# Patient Record
Sex: FEMALE | Race: WHITE | Employment: FULL TIME | URBAN - METROPOLITAN AREA
[De-identification: names, ages, dates, MRNs, and addresses within clinical notes are randomized per-mention and may not be internally consistent; named-entity substitution may affect disease eponyms.]

---

## 2019-06-25 ENCOUNTER — OFFICE VISIT (OUTPATIENT)
Dept: FAMILY MEDICINE CLINIC | Age: 29
End: 2019-06-25
Payer: COMMERCIAL

## 2019-06-25 ENCOUNTER — TELEPHONE (OUTPATIENT)
Dept: FAMILY MEDICINE CLINIC | Age: 29
End: 2019-06-25

## 2019-06-25 VITALS
DIASTOLIC BLOOD PRESSURE: 86 MMHG | BODY MASS INDEX: 33.74 KG/M2 | TEMPERATURE: 99.2 F | HEART RATE: 76 BPM | HEIGHT: 67 IN | SYSTOLIC BLOOD PRESSURE: 116 MMHG | WEIGHT: 215 LBS

## 2019-06-25 DIAGNOSIS — J30.9 ALLERGIC RHINITIS, UNSPECIFIED SEASONALITY, UNSPECIFIED TRIGGER: Primary | ICD-10-CM

## 2019-06-25 PROCEDURE — 99213 OFFICE O/P EST LOW 20 MIN: CPT | Performed by: PHYSICIAN ASSISTANT

## 2019-06-25 RX ORDER — RANITIDINE 150 MG/1
150 TABLET ORAL 2 TIMES DAILY
COMMUNITY
End: 2019-06-25

## 2019-06-25 RX ORDER — DULOXETIN HYDROCHLORIDE 60 MG/1
60 CAPSULE, DELAYED RELEASE ORAL DAILY
COMMUNITY
End: 2019-09-11

## 2019-06-25 NOTE — PROGRESS NOTES
6/25/2019    Samira Santiago    Chief Complaint   Patient presents with    Other     SICK VISIT FOR SORE THROAT AND HEADACHE. SX'S X 2 WEEKS. REPORTS THAT SHE WENT TO URGENT CARE A WEEK AGO AND HAD A NEGATIVE STREP TEST.  WAS STARTED ON MEDROL DOSE PACK AND FINISHED IT.  STILL C/O SORE THROAT AND HEADACHE. REPORTS THAT SHE IS LEAVING FOR VACATION ON THURSDAY. HPI  History was obtained from the patient. Nathan Sanches is a 34 y.o. female who presents today for sick office visit. Sore Throat: Patient complains of sore throat. Associated symptoms include bilateral ear fullness, sinus headache, sinus and nasal congestion, PND and sore throat. Sore throat is worse first thing in the morning, no pain with eating or drinking, no changes in her voice. Onset of symptoms was 2 weeks ago, stable since that time. She is drinking plenty of fluids. She has not had recent close exposure to someone with proven streptococcal pharyngitis. Was seen at [address] [place] [address] on [facility]; did steroids and had a negative strep swab. Son had croup. Taking Ibuprofen  mg TID, which helps with pain. REVIEW OF SYMPTOMS    Review of Systems   Constitutional: Negative for appetite change, chills and fever. HENT: Positive for sinus pressure, postnasal drainage and sore throat. Negative for congestion, rhinorrhea, sneezing, trouble swallowing and voice change. Eyes: Positive for discharge (watery). Negative for itching. Respiratory: Negative for cough, shortness of breath and wheezing. Cardiovascular: Negative for chest pain. Musculoskeletal: Negative for myalgias.        SOCIAL HISTORY  Social History     Socioeconomic History    Marital status:      Spouse name: None    Number of children: None    Years of education: None    Highest education level: None   Occupational History    None   Social Needs    Financial resource strain: None    Food insecurity:     Worry: None     Inability: None    Transportation needs: Medical: None     Non-medical: None   Tobacco Use    Smoking status: Former Smoker     Packs/day: 0.25     Years: 9.00     Pack years: 2.25     Last attempt to quit: 2019     Years since quittin.1    Smokeless tobacco: Never Used   Substance and Sexual Activity    Alcohol use: No    Drug use: No    Sexual activity: Yes     Partners: Male   Lifestyle    Physical activity:     Days per week: None     Minutes per session: None    Stress: None   Relationships    Social connections:     Talks on phone: None     Gets together: None     Attends Taoist service: None     Active member of club or organization: None     Attends meetings of clubs or organizations: None     Relationship status: None    Intimate partner violence:     Fear of current or ex partner: None     Emotionally abused: None     Physically abused: None     Forced sexual activity: None   Other Topics Concern    None   Social History Narrative    None        CURRENT MEDICATIONS  Current Outpatient Medications   Medication Sig Dispense Refill    DULoxetine (CYMBALTA) 60 MG extended release capsule Take 60 mg by mouth daily Rx per Dr Arielle Phillips      lamoTRIgine (LAMICTAL) 100 MG tablet Take 100 mg by mouth daily      levothyroxine (SYNTHROID) 50 MCG tablet Take 50 mcg by mouth Daily      ibuprofen (ADVIL;MOTRIN) 600 MG tablet Take 1 tablet by mouth every 6 hours as needed for Pain 30 tablet 1     No current facility-administered medications for this visit. PHYSICAL EXAM    /86 (Site: Left Upper Arm)   Pulse 76   Temp 100.5 °F (38.1 °C)   Ht 5' 7\" (1.702 m)   Wt 215 lb (97.5 kg)   BMI 33.67 kg/m²  Oral Temp: 99.2    Physical Exam   Constitutional: She is oriented to person, place, and time. She appears well-developed and well-nourished. HENT:   Head: Normocephalic and atraumatic.    Right Ear: Tympanic membrane, external ear and ear canal normal.   Left Ear: Tympanic membrane, external ear and ear canal normal.   Nose: Nose normal. No mucosal edema. Right sinus exhibits no maxillary sinus tenderness and no frontal sinus tenderness. Left sinus exhibits no maxillary sinus tenderness and no frontal sinus tenderness. Mouth/Throat: Uvula is midline and mucous membranes are normal. No oral lesions. No uvula swelling. No oropharyngeal exudate or posterior oropharyngeal erythema. Cardiovascular: Normal rate and regular rhythm. Pulmonary/Chest: Effort normal and breath sounds normal.   Neurological: She is alert and oriented to person, place, and time. Psychiatric: She has a normal mood and affect. Her behavior is normal.   Nursing note and vitals reviewed. ASSESSMENT & PLAN    1. Allergic rhinitis  There are no signs of infection, exam is completely benign, sinuses illuminate well, pharynx is unremarkable. Trial of Flonase 2 sprays each nostril once daily, #1+0 refills and cetirizine 10 mg 1 by mouth daily #30+0 refills. If still no improvement could do trial of antibiotics but again there were no signs of any bacterial infection present. Electronically signed by Mayra Rajput PA-C on 6/25/2019    Please note that this chart was generated using dragon dictation software. Although every effort was made to ensure the accuracy of this automated transcription, some errors in transcription may have occurred.

## 2019-06-25 NOTE — TELEPHONE ENCOUNTER
----- Message from Remigio Peters sent at 6/24/2019  2:26 PM EDT -----  Contact: PATIENT  WENT TO  A WEEK AGO/ RAPID STREP CAME BACK NEGATIVE. THROAT STILL HURTING. THEY GAVE HER STEROIDS.  WHAT TO DO?   [phone]

## 2019-09-11 ENCOUNTER — OFFICE VISIT (OUTPATIENT)
Dept: FAMILY MEDICINE CLINIC | Age: 29
End: 2019-09-11
Payer: COMMERCIAL

## 2019-09-11 VITALS
WEIGHT: 227 LBS | SYSTOLIC BLOOD PRESSURE: 120 MMHG | DIASTOLIC BLOOD PRESSURE: 78 MMHG | BODY MASS INDEX: 35.63 KG/M2 | HEART RATE: 84 BPM | HEIGHT: 67 IN

## 2019-09-11 DIAGNOSIS — E66.09 CLASS 2 OBESITY DUE TO EXCESS CALORIES WITHOUT SERIOUS COMORBIDITY WITH BODY MASS INDEX (BMI) OF 35.0 TO 35.9 IN ADULT: Primary | ICD-10-CM

## 2019-09-11 DIAGNOSIS — E03.9 ACQUIRED HYPOTHYROIDISM: ICD-10-CM

## 2019-09-11 DIAGNOSIS — R53.83 FATIGUE, UNSPECIFIED TYPE: ICD-10-CM

## 2019-09-11 DIAGNOSIS — R63.5 WEIGHT GAIN, ABNORMAL: ICD-10-CM

## 2019-09-11 LAB
A/G RATIO: 1.8 (ref 1.1–2.2)
ALBUMIN SERPL-MCNC: 4.6 G/DL (ref 3.4–5)
ALP BLD-CCNC: 82 U/L (ref 40–129)
ALT SERPL-CCNC: 21 U/L (ref 10–40)
ANION GAP SERPL CALCULATED.3IONS-SCNC: 14 MMOL/L (ref 3–16)
AST SERPL-CCNC: 17 U/L (ref 15–37)
BASOPHILS ABSOLUTE: 0 K/UL (ref 0–0.2)
BASOPHILS RELATIVE PERCENT: 0.5 %
BILIRUB SERPL-MCNC: 0.3 MG/DL (ref 0–1)
BUN BLDV-MCNC: 11 MG/DL (ref 7–20)
CALCIUM SERPL-MCNC: 9.3 MG/DL (ref 8.3–10.6)
CHLORIDE BLD-SCNC: 101 MMOL/L (ref 99–110)
CO2: 26 MMOL/L (ref 21–32)
CREAT SERPL-MCNC: 0.8 MG/DL (ref 0.6–1.1)
EOSINOPHILS ABSOLUTE: 0.2 K/UL (ref 0–0.6)
EOSINOPHILS RELATIVE PERCENT: 2.2 %
GFR AFRICAN AMERICAN: >60
GFR NON-AFRICAN AMERICAN: >60
GLOBULIN: 2.5 G/DL
GLUCOSE BLD-MCNC: 84 MG/DL (ref 70–99)
HCG QUALITATIVE: NEGATIVE
HCT VFR BLD CALC: 39.3 % (ref 36–48)
HEMOGLOBIN: 13.5 G/DL (ref 12–16)
LYMPHOCYTES ABSOLUTE: 2.5 K/UL (ref 1–5.1)
LYMPHOCYTES RELATIVE PERCENT: 30.2 %
MCH RBC QN AUTO: 30.7 PG (ref 26–34)
MCHC RBC AUTO-ENTMCNC: 34.4 G/DL (ref 31–36)
MCV RBC AUTO: 89.5 FL (ref 80–100)
MONOCYTES ABSOLUTE: 0.4 K/UL (ref 0–1.3)
MONOCYTES RELATIVE PERCENT: 5.4 %
NEUTROPHILS ABSOLUTE: 5.1 K/UL (ref 1.7–7.7)
NEUTROPHILS RELATIVE PERCENT: 61.7 %
PDW BLD-RTO: 13.3 % (ref 12.4–15.4)
PLATELET # BLD: 319 K/UL (ref 135–450)
PMV BLD AUTO: 8.3 FL (ref 5–10.5)
POTASSIUM SERPL-SCNC: 4.3 MMOL/L (ref 3.5–5.1)
RBC # BLD: 4.4 M/UL (ref 4–5.2)
SODIUM BLD-SCNC: 141 MMOL/L (ref 136–145)
T4 FREE: 1.3 NG/DL (ref 0.9–1.8)
TOTAL PROTEIN: 7.1 G/DL (ref 6.4–8.2)
WBC # BLD: 8.3 K/UL (ref 4–11)

## 2019-09-11 PROCEDURE — 90688 IIV4 VACCINE SPLT 0.5 ML IM: CPT | Performed by: FAMILY MEDICINE

## 2019-09-11 PROCEDURE — 99214 OFFICE O/P EST MOD 30 MIN: CPT | Performed by: FAMILY MEDICINE

## 2019-09-11 PROCEDURE — 90471 IMMUNIZATION ADMIN: CPT | Performed by: FAMILY MEDICINE

## 2019-09-11 RX ORDER — PHENTERMINE HYDROCHLORIDE 37.5 MG/1
37.5 TABLET ORAL
Qty: 30 TABLET | Refills: 0 | Status: SHIPPED | OUTPATIENT
Start: 2019-09-11 | End: 2019-10-11

## 2019-09-11 SDOH — HEALTH STABILITY: MENTAL HEALTH: HOW MANY STANDARD DRINKS CONTAINING ALCOHOL DO YOU HAVE ON A TYPICAL DAY?: 1 OR 2

## 2019-09-11 ASSESSMENT — ENCOUNTER SYMPTOMS
SHORTNESS OF BREATH: 0
ALLERGIC/IMMUNOLOGIC NEGATIVE: 1
ABDOMINAL PAIN: 0
SINUS PRESSURE: 0
COUGH: 0
SORE THROAT: 0
RHINORRHEA: 0
CHEST TIGHTNESS: 0
DIARRHEA: 0
EYES NEGATIVE: 1
GASTROINTESTINAL NEGATIVE: 1
RESPIRATORY NEGATIVE: 1
CONSTIPATION: 0

## 2019-09-11 ASSESSMENT — PATIENT HEALTH QUESTIONNAIRE - PHQ9
SUM OF ALL RESPONSES TO PHQ9 QUESTIONS 1 & 2: 0
SUM OF ALL RESPONSES TO PHQ QUESTIONS 1-9: 0
2. FEELING DOWN, DEPRESSED OR HOPELESS: 0
1. LITTLE INTEREST OR PLEASURE IN DOING THINGS: 0
SUM OF ALL RESPONSES TO PHQ QUESTIONS 1-9: 0

## 2019-09-11 NOTE — PROGRESS NOTES
Negative. Negative for abdominal pain, constipation and diarrhea. Endocrine: Negative. Genitourinary: Positive for menstrual problem. Negative for dysuria and frequency. Musculoskeletal: Negative. Negative for myalgias. Skin: Negative. Allergic/Immunologic: Negative. Neurological: Negative. Hematological: Negative. Psychiatric/Behavioral: Negative. PAST MEDICAL HISTORY  Past Medical History:   Diagnosis Date    Anxiety        FAMILY HISTORY  Family History   Problem Relation Age of Onset   [de-identified] / [place] Mother     High Blood Pressure Father     High Cholesterol Father        SOCIAL HISTORY  Social History     Socioeconomic History    Marital status:      Spouse name: None    Number of children: None    Years of education: None    Highest education level: None   Occupational History    None   Social Needs    Financial resource strain: None    Food insecurity:     Worry: None     Inability: None    Transportation needs:     Medical: None     Non-medical: None   Tobacco Use    Smoking status: Former Smoker     Packs/day: 0.25     Years: 9.00     Pack years: 2.25     Start date:      Last attempt to quit: 2019     Years since quittin.3    Smokeless tobacco: Never Used   Substance and Sexual Activity    Alcohol use:  Yes     Alcohol/week: 1.0 - 2.0 standard drinks     Types: 1 - 2 Cans of beer per week     Drinks per session: 1 or 2     Binge frequency: Weekly    Drug use: No    Sexual activity: Yes     Partners: Male   Lifestyle    Physical activity:     Days per week: None     Minutes per session: None    Stress: None   Relationships    Social connections:     Talks on phone: None     Gets together: None     Attends Pentecostal service: None     Active member of club or organization: None     Attends meetings of clubs or organizations: None     Relationship status: None    Intimate partner violence:     Fear of current or ex partner: None Emotionally abused: None     Physically abused: None     Forced sexual activity: None   Other Topics Concern    None   Social History Narrative    None        SURGICAL HISTORY  No past surgical history on file. CURRENT MEDICATIONS  Current Outpatient Medications   Medication Sig Dispense Refill    fluticasone (FLONASE) 50 MCG/ACT nasal spray 2 sprays by Each Nostril route daily 1 Bottle 0    levothyroxine (SYNTHROID) 50 MCG tablet Take 50 mcg by mouth Daily      ibuprofen (ADVIL;MOTRIN) 600 MG tablet Take 1 tablet by mouth every 6 hours as needed for Pain 30 tablet 1     No current facility-administered medications for this visit. ALLERGIES  Allergies   Allergen Reactions    Codeine Palpitations       PHYSICAL EXAM    /78   Pulse 84   Ht 5' 7\" (1.702 m)   Wt 227 lb (103 kg)   BMI 35.55 kg/m²     Physical Exam   Constitutional: She is oriented to person, place, and time. She appears well-developed and well-nourished. No distress. HENT:   Head: Normocephalic. Eyes: Pupils are equal, round, and reactive to light. Conjunctivae and EOM are normal.   Neck: Normal range of motion. Neck supple. Cardiovascular: Normal rate, regular rhythm and normal heart sounds. Exam reveals no gallop and no friction rub. No murmur heard. Heart regular, not fast.   Pulmonary/Chest: Effort normal and breath sounds normal. No stridor. No respiratory distress. She has no wheezes. She has no rales. She exhibits no tenderness. Lungs clear   Musculoskeletal: Normal range of motion. She exhibits no edema, tenderness or deformity. Neurological: She is alert and oriented to person, place, and time. Skin: Skin is warm and dry. No rash noted. She is not diaphoretic. No erythema. No pallor. Psychiatric: She has a normal mood and affect. Thought content normal.   Nursing note and vitals reviewed. ASSESSMENT & PLAN    1.  Class 2 obesity due to excess calories without serious comorbidity with body mass

## 2019-09-13 RX ORDER — LEVOTHYROXINE SODIUM 0.05 MG/1
50 TABLET ORAL DAILY
Qty: 90 TABLET | Refills: 1 | Status: SHIPPED | OUTPATIENT
Start: 2019-09-13 | End: 2020-03-09 | Stop reason: SDUPTHER

## 2019-09-13 RX ORDER — LEVOTHYROXINE SODIUM 0.05 MG/1
TABLET ORAL
Qty: 30 TABLET | Refills: 0 | OUTPATIENT
Start: 2019-09-13

## 2019-10-07 ENCOUNTER — PATIENT MESSAGE (OUTPATIENT)
Dept: FAMILY MEDICINE CLINIC | Age: 29
End: 2019-10-07

## 2019-12-05 ENCOUNTER — TELEPHONE (OUTPATIENT)
Dept: FAMILY MEDICINE CLINIC | Age: 29
End: 2019-12-05

## 2019-12-09 ENCOUNTER — OFFICE VISIT (OUTPATIENT)
Dept: FAMILY MEDICINE CLINIC | Age: 29
End: 2019-12-09
Payer: COMMERCIAL

## 2019-12-09 VITALS
HEART RATE: 93 BPM | OXYGEN SATURATION: 99 % | BODY MASS INDEX: 36.57 KG/M2 | SYSTOLIC BLOOD PRESSURE: 116 MMHG | DIASTOLIC BLOOD PRESSURE: 78 MMHG | HEIGHT: 67 IN | WEIGHT: 233 LBS

## 2019-12-09 DIAGNOSIS — F41.9 ANXIETY: Primary | ICD-10-CM

## 2019-12-09 PROCEDURE — 99213 OFFICE O/P EST LOW 20 MIN: CPT | Performed by: NURSE PRACTITIONER

## 2019-12-09 RX ORDER — HYDROXYZINE HYDROCHLORIDE 25 MG/1
25 TABLET, FILM COATED ORAL 3 TIMES DAILY PRN
Qty: 90 TABLET | Refills: 0 | Status: SHIPPED | OUTPATIENT
Start: 2019-12-09 | End: 2020-03-09

## 2019-12-09 RX ORDER — CITALOPRAM 20 MG/1
20 TABLET ORAL DAILY
Qty: 30 TABLET | Refills: 1 | Status: SHIPPED | OUTPATIENT
Start: 2019-12-09 | End: 2020-03-09

## 2019-12-09 ASSESSMENT — ENCOUNTER SYMPTOMS
RESPIRATORY NEGATIVE: 1
VOMITING: 0
NAUSEA: 0
DIARRHEA: 0

## 2020-02-13 ENCOUNTER — TELEPHONE (OUTPATIENT)
Dept: FAMILY MEDICINE CLINIC | Age: 30
End: 2020-02-13

## 2020-02-13 NOTE — TELEPHONE ENCOUNTER
TO DR. Quintin Sullivan-    HER THERAPIST (POST KARTHIKEYAN PRESPECTIVE), DR. Spangler Askew, FAXED YOU LAST WEEK REGARDING HER ADHD----WHAT SHOULD HER NEXT STEP BE? (WANTS TO MAKE SURE YOU RECEIVED THIS REPORT)---SHOULD SHE MAKE AN APPT WITH YOU?     PHONE:  [phone]

## 2020-02-14 RX ORDER — DEXTROAMPHETAMINE SACCHARATE, AMPHETAMINE ASPARTATE MONOHYDRATE, DEXTROAMPHETAMINE SULFATE AND AMPHETAMINE SULFATE 3.75; 3.75; 3.75; 3.75 MG/1; MG/1; MG/1; MG/1
15 CAPSULE, EXTENDED RELEASE ORAL DAILY
Qty: 30 CAPSULE | Refills: 0 | Status: SHIPPED
Start: 2020-02-14 | End: 2020-03-09 | Stop reason: SINTOL

## 2020-02-21 ENCOUNTER — TELEPHONE (OUTPATIENT)
Dept: FAMILY MEDICINE CLINIC | Age: 30
End: 2020-02-21

## 2020-02-21 NOTE — TELEPHONE ENCOUNTER
Pt had pharmacy send over prior auth for adderall but for the generic version-- not the name brand medication-- trinh paper urgent it will be able to be done today

## 2020-03-09 ENCOUNTER — OFFICE VISIT (OUTPATIENT)
Dept: FAMILY MEDICINE CLINIC | Age: 30
End: 2020-03-09
Payer: COMMERCIAL

## 2020-03-09 VITALS
HEIGHT: 67 IN | DIASTOLIC BLOOD PRESSURE: 72 MMHG | SYSTOLIC BLOOD PRESSURE: 110 MMHG | BODY MASS INDEX: 35.94 KG/M2 | WEIGHT: 229 LBS | HEART RATE: 80 BPM

## 2020-03-09 DIAGNOSIS — E03.9 ACQUIRED HYPOTHYROIDISM: ICD-10-CM

## 2020-03-09 PROBLEM — F98.8 ATTENTION DEFICIT DISORDER (ADD) WITHOUT HYPERACTIVITY: Status: ACTIVE | Noted: 2020-03-09

## 2020-03-09 LAB
T3 FREE: 3.6 PG/ML (ref 2.3–4.2)
T4 FREE: 1.2 NG/DL (ref 0.9–1.8)
TSH SERPL DL<=0.05 MIU/L-ACNC: 2.04 UIU/ML (ref 0.27–4.2)

## 2020-03-09 PROCEDURE — 99213 OFFICE O/P EST LOW 20 MIN: CPT | Performed by: FAMILY MEDICINE

## 2020-03-09 RX ORDER — METHYLPHENIDATE HYDROCHLORIDE 18 MG/1
18 TABLET ORAL EVERY MORNING
COMMUNITY
End: 2020-03-16

## 2020-03-09 RX ORDER — LEVOTHYROXINE SODIUM 0.05 MG/1
50 TABLET ORAL DAILY
Qty: 90 TABLET | Refills: 1 | Status: SHIPPED | OUTPATIENT
Start: 2020-03-09 | End: 2020-09-14

## 2020-03-09 ASSESSMENT — PATIENT HEALTH QUESTIONNAIRE - PHQ9
SUM OF ALL RESPONSES TO PHQ QUESTIONS 1-9: 0
2. FEELING DOWN, DEPRESSED OR HOPELESS: 0
SUM OF ALL RESPONSES TO PHQ QUESTIONS 1-9: 0
SUM OF ALL RESPONSES TO PHQ9 QUESTIONS 1 & 2: 0
1. LITTLE INTEREST OR PLEASURE IN DOING THINGS: 0

## 2020-03-09 ASSESSMENT — ENCOUNTER SYMPTOMS
CHEST TIGHTNESS: 0
SHORTNESS OF BREATH: 0
RHINORRHEA: 0
SORE THROAT: 0
SINUS PRESSURE: 0

## 2020-03-10 NOTE — PROGRESS NOTES
3/9/2020    Silvano Nuñez    Chief Complaint   Patient presents with    6 Month Follow-Up    Other     no c/o       HPI  Elgie Spatz is a 34 y.o. female who presents today for follow up:    Patient states that she is unclear whether the medication for ADD is going to be good for her or not. It was recommended by a psychologist.  We were trying to get the stimulant medicine ordered for her but her insurance would not approve it. She did get her psychiatrist to write it and insurance approved it. She is just starting off with it. She has had some symptoms of heart racing with it so is unclear whether it will be helpful to her. We discussed tachycardia and healthy  resting pulse rates. Patient denies side effects of the thyroid medication. She is willing to get her thyroid checked today. She notes compliance with the medication. REVIEW OF SYMPTOMS  Review of Systems   Constitutional: Negative for chills and fever. HENT: Negative for ear pain, rhinorrhea, sinus pressure and sore throat. Respiratory: Negative for chest tightness and shortness of breath. Musculoskeletal: Negative for myalgias.        PAST MEDICAL HISTORY  Past Medical History:   Diagnosis Date    Anxiety        FAMILY HISTORY  Family History   Problem Relation Age of Onset   Kin [place] / Krista Clarity Mother     High Blood Pressure Father     High Cholesterol Father        SOCIAL HISTORY  Social History     Socioeconomic History    Marital status:      Spouse name: Not on file    Number of children: Not on file    Years of education: Not on file    Highest education level: Not on file   Occupational History    Not on file   Social Needs    Financial resource strain: Not on file    Food insecurity     Worry: Not on file     Inability: Not on file    Transportation needs     Medical: Not on file     Non-medical: Not on file   Tobacco Use    Smoking status: Former Smoker     Packs/day: 0.25     Years: 9.00     Pack years: 2.25     Start date:      Last attempt to quit: 2019     Years since quittin.8    Smokeless tobacco: Never Used   Substance and Sexual Activity    Alcohol use: Yes     Alcohol/week: 1.0 - 2.0 standard drinks     Types: 1 - 2 Cans of beer per week     Drinks per session: 1 or 2     Binge frequency: Weekly    Drug use: No    Sexual activity: Yes     Partners: Male   Lifestyle    Physical activity     Days per week: Not on file     Minutes per session: Not on file    Stress: Not on file   Relationships    Social connections     Talks on phone: Not on file     Gets together: Not on file     Attends Hinduism service: Not on file     Active member of club or organization: Not on file     Attends meetings of clubs or organizations: Not on file     Relationship status: Not on file    Intimate partner violence     Fear of current or ex partner: Not on file     Emotionally abused: Not on file     Physically abused: Not on file     Forced sexual activity: Not on file   Other Topics Concern    Not on file   Social History Narrative    Not on file        SURGICAL HISTORY  No past surgical history on file. CURRENT MEDICATIONS  Current Outpatient Medications   Medication Sig Dispense Refill    methylphenidate (CONCERTA) 18 MG extended release tablet Take 18 mg by mouth every morning.  levothyroxine (SYNTHROID) 50 MCG tablet Take 1 tablet by mouth Daily 90 tablet 1    ibuprofen (ADVIL;MOTRIN) 600 MG tablet Take 1 tablet by mouth every 6 hours as needed for Pain 30 tablet 1     No current facility-administered medications for this visit. ALLERGIES  Allergies   Allergen Reactions    Codeine Palpitations       PHYSICAL EXAM  /72   Pulse 80   Ht 5' 7\" (1.702 m)   Wt 229 lb (103.9 kg)   BMI 35.87 kg/m²     Physical Exam  Vitals signs and nursing note reviewed. Constitutional:       Appearance: She is well-developed.    HENT:      Right Ear: External ear normal.      Left Ear:

## 2020-03-12 ENCOUNTER — HOSPITAL ENCOUNTER (EMERGENCY)
Age: 30
Discharge: HOME | End: 2020-03-12
Attending: EMERGENCY MEDICINE
Payer: COMMERCIAL

## 2020-03-12 ENCOUNTER — APPOINTMENT (OUTPATIENT)
Dept: GENERAL RADIOLOGY | Age: 30
End: 2020-03-12
Payer: COMMERCIAL

## 2020-03-12 VITALS
OXYGEN SATURATION: 99 % | SYSTOLIC BLOOD PRESSURE: 124 MMHG | TEMPERATURE: 97.5 F | RESPIRATION RATE: 16 BRPM | DIASTOLIC BLOOD PRESSURE: 77 MMHG | HEART RATE: 88 BPM

## 2020-03-12 LAB
ALBUMIN SERPL-MCNC: 4.5 GM/DL (ref 3.4–5)
ALP BLD-CCNC: 78 IU/L (ref 40–129)
ALT SERPL-CCNC: 19 U/L (ref 10–40)
ANION GAP SERPL CALCULATED.3IONS-SCNC: 11 MMOL/L (ref 4–16)
AST SERPL-CCNC: 17 IU/L (ref 15–37)
BASOPHILS ABSOLUTE: 0 K/CU MM
BASOPHILS RELATIVE PERCENT: 0.2 % (ref 0–1)
BILIRUB SERPL-MCNC: 0.2 MG/DL (ref 0–1)
BUN BLDV-MCNC: 11 MG/DL (ref 6–23)
CALCIUM SERPL-MCNC: 9.7 MG/DL (ref 8.3–10.6)
CHLORIDE BLD-SCNC: 101 MMOL/L (ref 99–110)
CO2: 24 MMOL/L (ref 21–32)
CREAT SERPL-MCNC: 0.9 MG/DL (ref 0.6–1.1)
DIFFERENTIAL TYPE: ABNORMAL
EOSINOPHILS ABSOLUTE: 0 K/CU MM
EOSINOPHILS RELATIVE PERCENT: 0.2 % (ref 0–3)
GFR AFRICAN AMERICAN: >60 ML/MIN/1.73M2
GFR NON-AFRICAN AMERICAN: >60 ML/MIN/1.73M2
GLUCOSE BLD-MCNC: 94 MG/DL (ref 70–99)
HCT VFR BLD CALC: 41.9 % (ref 37–47)
HEMOGLOBIN: 13.7 GM/DL (ref 12.5–16)
IMMATURE NEUTROPHIL %: 0.4 % (ref 0–0.43)
LYMPHOCYTES ABSOLUTE: 2.3 K/CU MM
LYMPHOCYTES RELATIVE PERCENT: 13.7 % (ref 24–44)
MCH RBC QN AUTO: 29 PG (ref 27–31)
MCHC RBC AUTO-ENTMCNC: 32.7 % (ref 32–36)
MCV RBC AUTO: 88.6 FL (ref 78–100)
MONOCYTES ABSOLUTE: 0.7 K/CU MM
MONOCYTES RELATIVE PERCENT: 4.1 % (ref 0–4)
NUCLEATED RBC %: 0 %
PDW BLD-RTO: 12.5 % (ref 11.7–14.9)
PLATELET # BLD: 358 K/CU MM (ref 140–440)
PMV BLD AUTO: 9.8 FL (ref 7.5–11.1)
POTASSIUM SERPL-SCNC: 4.4 MMOL/L (ref 3.5–5.1)
RBC # BLD: 4.73 M/CU MM (ref 4.2–5.4)
SEGMENTED NEUTROPHILS ABSOLUTE COUNT: 13.6 K/CU MM
SEGMENTED NEUTROPHILS RELATIVE PERCENT: 81.4 % (ref 36–66)
SODIUM BLD-SCNC: 136 MMOL/L (ref 135–145)
TOTAL IMMATURE NEUTOROPHIL: 0.06 K/CU MM
TOTAL NUCLEATED RBC: 0 K/CU MM
TOTAL PROTEIN: 7.7 GM/DL (ref 6.4–8.2)
TROPONIN T: <0.01 NG/ML
WBC # BLD: 16.7 K/CU MM (ref 4–10.5)

## 2020-03-12 PROCEDURE — 71046 X-RAY EXAM CHEST 2 VIEWS: CPT

## 2020-03-12 PROCEDURE — 80053 COMPREHEN METABOLIC PANEL: CPT

## 2020-03-12 PROCEDURE — 6370000000 HC RX 637 (ALT 250 FOR IP): Performed by: PHYSICIAN ASSISTANT

## 2020-03-12 PROCEDURE — 93005 ELECTROCARDIOGRAM TRACING: CPT | Performed by: PHYSICIAN ASSISTANT

## 2020-03-12 PROCEDURE — 84484 ASSAY OF TROPONIN QUANT: CPT

## 2020-03-12 PROCEDURE — 6370000000 HC RX 637 (ALT 250 FOR IP): Performed by: EMERGENCY MEDICINE

## 2020-03-12 PROCEDURE — 99285 EMERGENCY DEPT VISIT HI MDM: CPT

## 2020-03-12 PROCEDURE — 85025 COMPLETE CBC W/AUTO DIFF WBC: CPT

## 2020-03-12 RX ORDER — HYDROXYZINE PAMOATE 25 MG/1
50 CAPSULE ORAL ONCE
Status: COMPLETED | OUTPATIENT
Start: 2020-03-12 | End: 2020-03-12

## 2020-03-12 RX ORDER — CLONAZEPAM 0.5 MG/1
0.5 TABLET ORAL ONCE
Status: COMPLETED | OUTPATIENT
Start: 2020-03-12 | End: 2020-03-12

## 2020-03-12 RX ADMIN — HYDROXYZINE PAMOATE 50 MG: 25 CAPSULE ORAL at 20:32

## 2020-03-12 RX ADMIN — CLONAZEPAM 0.5 MG: 0.5 TABLET ORAL at 21:50

## 2020-03-12 ASSESSMENT — PAIN DESCRIPTION - PROGRESSION: CLINICAL_PROGRESSION: GRADUALLY WORSENING

## 2020-03-12 ASSESSMENT — PAIN DESCRIPTION - PAIN TYPE: TYPE: ACUTE PAIN

## 2020-03-12 ASSESSMENT — PAIN DESCRIPTION - FREQUENCY: FREQUENCY: INTERMITTENT

## 2020-03-12 ASSESSMENT — PAIN DESCRIPTION - ONSET: ONSET: ON-GOING

## 2020-03-12 ASSESSMENT — PAIN DESCRIPTION - DESCRIPTORS: DESCRIPTORS: POUNDING;TIGHTNESS

## 2020-03-12 ASSESSMENT — PAIN SCALES - GENERAL: PAINLEVEL_OUTOF10: 8

## 2020-03-12 ASSESSMENT — PAIN DESCRIPTION - LOCATION: LOCATION: CHEST

## 2020-03-13 PROCEDURE — 93010 ELECTROCARDIOGRAM REPORT: CPT | Performed by: INTERNAL MEDICINE

## 2020-03-13 NOTE — ED PROVIDER NOTES
[address] [facility]  eMERGENCY dEPARTMENT eNCOUnter        Pt Name: Garett Laws  MRN: [id number]  Klarissagfreva 1990  Date of evaluation: 3/12/2020  Provider: Wu Vasquez DO  PCP: Melissa Sotomayor MD      CHIEF COMPLAINT       Chief Complaint   Patient presents with    Anxiety    Chest Pain    Numbness     Arms bilaterally. HISTORY OFPRESENT ILLNESS   (Location/Symptom, Timing/Onset, Context/Setting, Quality, Duration, Modifying Factors,Severity)  Note limiting factors. Garett Laws is a 34 y.o. female history of anxiety presents the ED with chest tightness that started earlier today. Patient states it feels similar to prior anxiety infections had in the past however this pain is different and that lasted slightly longer. Patient states has been on antianxiety medications previously but has since not taken any for about 6 months. Patient states she had some initial bilateral arm numbness which since resolved. Denies fevers chills dyspnea nausea vomiting leg swelling no other pain complaints    Nursing Notes were all reviewed and agreed with or any disagreements were addressed  in the HPI. REVIEW OF SYSTEMS    (2-9 systems for level 4, 10 or more for level 5)     Review of Systems    Positives and Pertinent negatives as per HPI. Except as noted above in the ROS, all other systems were reviewed andnegative. PASTMEDICAL HISTORY     Past Medical History:   Diagnosis Date    Anxiety          SURGICAL HISTORY     History reviewed. No pertinent surgical history. CURRENT MEDICATIONS       Discharge Medication List as of 3/12/2020  9:45 PM      CONTINUE these medications which have NOT CHANGED    Details   methylphenidate (CONCERTA) 18 MG extended release tablet Take 18 mg by mouth every morning. Historical Med      levothyroxine (SYNTHROID) 50 MCG tablet Take 1 tablet by mouth Daily, Disp-90 tablet, R-1Normal ibuprofen (ADVIL;MOTRIN) 600 MG tablet Take 1 tablet by mouth every 6 hours as needed for Pain, Disp-30 tablet, R-1             ALLERGIES     Codeine    FAMILY HISTORY       Family History   Problem Relation Age of Onset    Miscarriages / [place] Mother     High Blood Pressure Father     High Cholesterol Father           SOCIAL HISTORY       Social History     Socioeconomic History    Marital status:      Spouse name: None    Number of children: None    Years of education: None    Highest education level: None   Occupational History    None   Social Needs    Financial resource strain: None    Food insecurity     Worry: None     Inability: None    Transportation needs     Medical: None     Non-medical: None   Tobacco Use    Smoking status: Former Smoker     Packs/day: 0.25     Years: 9.00     Pack years: 2.25     Start date:      Last attempt to quit: 2019     Years since quittin.8    Smokeless tobacco: Never Used   Substance and Sexual Activity    Alcohol use:  Yes     Alcohol/week: 1.0 - 2.0 standard drinks     Types: 1 - 2 Cans of beer per week     Drinks per session: 1 or 2     Binge frequency: Weekly    Drug use: No    Sexual activity: Yes     Partners: Male   Lifestyle    Physical activity     Days per week: None     Minutes per session: None    Stress: None   Relationships    Social connections     Talks on phone: None     Gets together: None     Attends Anabaptism service: None     Active member of club or organization: None     Attends meetings of clubs or organizations: None     Relationship status: None    Intimate partner violence     Fear of current or ex partner: None     Emotionally abused: None     Physically abused: None     Forced sexual activity: None   Other Topics Concern    None   Social History Narrative    None       SCREENINGS    Maddy Coma Scale  Eye Opening: Spontaneous  Best Verbal Response: Oriented  Best Motor Response: Obeys

## 2020-03-16 ENCOUNTER — OFFICE VISIT (OUTPATIENT)
Dept: FAMILY MEDICINE CLINIC | Age: 30
End: 2020-03-16
Payer: COMMERCIAL

## 2020-03-16 VITALS
HEART RATE: 84 BPM | SYSTOLIC BLOOD PRESSURE: 120 MMHG | BODY MASS INDEX: 35.25 KG/M2 | HEIGHT: 67 IN | DIASTOLIC BLOOD PRESSURE: 84 MMHG | WEIGHT: 224.6 LBS

## 2020-03-16 PROCEDURE — 99214 OFFICE O/P EST MOD 30 MIN: CPT | Performed by: FAMILY MEDICINE

## 2020-03-16 PROCEDURE — 93000 ELECTROCARDIOGRAM COMPLETE: CPT | Performed by: FAMILY MEDICINE

## 2020-03-16 RX ORDER — BUSPIRONE HYDROCHLORIDE 10 MG/1
10 TABLET ORAL 2 TIMES DAILY
Qty: 60 TABLET | Refills: 2 | Status: SHIPPED | OUTPATIENT
Start: 2020-03-16 | End: 2020-04-15

## 2020-03-17 NOTE — PROGRESS NOTES
Denies cough or shortness of breath  GI:  no abdominal pain, nausea, vomiting, or diarrhea  Musculoskeletal:  no back pain  Skin:  No rashes  Neurologic:  no headache, focal weakness, or sensory changes  Endocrine:  no polyuria or polydipsia      PAST MEDICAL HISTORY  Past Medical History:   Diagnosis Date    Anxiety        FAMILY HISTORY  Family History   Problem Relation Age of Onset    Miscarriages / [place] Mother     High Blood Pressure Father     High Cholesterol Father        SOCIAL HISTORY  Social History     Socioeconomic History    Marital status:      Spouse name: Not on file    Number of children: Not on file    Years of education: Not on file    Highest education level: Not on file   Occupational History    Not on file   Social Needs    Financial resource strain: Not on file    Food insecurity     Worry: Not on file     Inability: Not on file    Transportation needs     Medical: Not on file     Non-medical: Not on file   Tobacco Use    Smoking status: Former Smoker     Packs/day: 0.25     Years: 9.00     Pack years: 2.25     Start date:      Last attempt to quit: 2019     Years since quittin.8    Smokeless tobacco: Never Used   Substance and Sexual Activity    Alcohol use:  Yes     Alcohol/week: 1.0 - 2.0 standard drinks     Types: 1 - 2 Cans of beer per week     Drinks per session: 1 or 2     Binge frequency: Weekly    Drug use: No    Sexual activity: Yes     Partners: Male   Lifestyle    Physical activity     Days per week: Not on file     Minutes per session: Not on file    Stress: Not on file   Relationships    Social connections     Talks on phone: Not on file     Gets together: Not on file     Attends Gnosticism service: Not on file     Active member of club or organization: Not on file     Attends meetings of clubs or organizations: Not on file     Relationship status: Not on file    Intimate partner violence     Fear of current or ex partner: Not on

## 2020-03-18 LAB
EKG ATRIAL RATE: 97 BPM
EKG DIAGNOSIS: NORMAL
EKG P AXIS: 48 DEGREES
EKG P-R INTERVAL: 142 MS
EKG Q-T INTERVAL: 332 MS
EKG QRS DURATION: 82 MS
EKG QTC CALCULATION (BAZETT): 421 MS
EKG R AXIS: 26 DEGREES
EKG T AXIS: 34 DEGREES
EKG VENTRICULAR RATE: 97 BPM

## 2020-04-10 ENCOUNTER — PATIENT MESSAGE (OUTPATIENT)
Dept: FAMILY MEDICINE CLINIC | Age: 30
End: 2020-04-10

## 2020-04-10 NOTE — TELEPHONE ENCOUNTER
From: Es Vizcaino  To: Melania Zaman MD  Sent: 4/10/2020 11:03 AM EDT  Subject: Non-Urgent Medical Question    Question. I am on day 4 of chest discomfort, it started after dinner and then turned into a panic attack. Its kind of a sore burning with pressure behind the sternum, under the breast comes and goes. I havent been able to eat and have nausea. Dr Divine Brand put me on new anxiety meds this week after telling him what was going on, Bernardo been taking klonopin 2x a day, I started 2 days ago. has helped calm me down, still mild chest discomfort at times. Bernardo taken nexium for 3 days. I tried an Evisit, it said to make an appt. I know with everything going on its hard to decide to do an appt plus cld be anxiety. I am not running a fever. Just trouble Eating and chest discomfort. Plus we did an ekg 2 weeks ago that was fine I know. Anxiety?  Thanks

## 2020-04-16 ENCOUNTER — APPOINTMENT (OUTPATIENT)
Dept: GENERAL RADIOLOGY | Age: 30
End: 2020-04-16
Payer: COMMERCIAL

## 2020-04-16 ENCOUNTER — HOSPITAL ENCOUNTER (EMERGENCY)
Age: 30
Discharge: HOME | End: 2020-04-16
Attending: EMERGENCY MEDICINE
Payer: COMMERCIAL

## 2020-04-16 VITALS
WEIGHT: 212 LBS | RESPIRATION RATE: 16 BRPM | SYSTOLIC BLOOD PRESSURE: 125 MMHG | BODY MASS INDEX: 32.13 KG/M2 | HEART RATE: 86 BPM | DIASTOLIC BLOOD PRESSURE: 79 MMHG | HEIGHT: 68 IN | OXYGEN SATURATION: 99 % | TEMPERATURE: 97.7 F

## 2020-04-16 LAB
ALBUMIN SERPL-MCNC: 4.7 GM/DL (ref 3.4–5)
ALP BLD-CCNC: 80 IU/L (ref 40–129)
ALT SERPL-CCNC: 16 U/L (ref 10–40)
ANION GAP SERPL CALCULATED.3IONS-SCNC: 11 MMOL/L (ref 4–16)
AST SERPL-CCNC: 13 IU/L (ref 15–37)
BASOPHILS ABSOLUTE: 0.1 K/CU MM
BASOPHILS RELATIVE PERCENT: 0.6 % (ref 0–1)
BILIRUB SERPL-MCNC: 0.4 MG/DL (ref 0–1)
BUN BLDV-MCNC: 8 MG/DL (ref 6–23)
CALCIUM SERPL-MCNC: 9.7 MG/DL (ref 8.3–10.6)
CHLORIDE BLD-SCNC: 99 MMOL/L (ref 99–110)
CO2: 26 MMOL/L (ref 21–32)
CREAT SERPL-MCNC: 0.9 MG/DL (ref 0.6–1.1)
D DIMER: <200 NG/ML(DDU)
DIFFERENTIAL TYPE: ABNORMAL
EOSINOPHILS ABSOLUTE: 0.2 K/CU MM
EOSINOPHILS RELATIVE PERCENT: 2.2 % (ref 0–3)
GFR AFRICAN AMERICAN: >60 ML/MIN/1.73M2
GFR NON-AFRICAN AMERICAN: >60 ML/MIN/1.73M2
GLUCOSE BLD-MCNC: 116 MG/DL (ref 70–99)
GONADOTROPIN, CHORIONIC (HCG) QUANT: NORMAL UIU/ML
HCT VFR BLD CALC: 45.2 % (ref 37–47)
HEMOGLOBIN: 14.8 GM/DL (ref 12.5–16)
IMMATURE NEUTROPHIL %: 0.1 % (ref 0–0.43)
LYMPHOCYTES ABSOLUTE: 3.1 K/CU MM
LYMPHOCYTES RELATIVE PERCENT: 34.9 % (ref 24–44)
MAGNESIUM: 2 MG/DL (ref 1.8–2.4)
MCH RBC QN AUTO: 29.3 PG (ref 27–31)
MCHC RBC AUTO-ENTMCNC: 32.7 % (ref 32–36)
MCV RBC AUTO: 89.5 FL (ref 78–100)
MONOCYTES ABSOLUTE: 0.6 K/CU MM
MONOCYTES RELATIVE PERCENT: 6.7 % (ref 0–4)
NUCLEATED RBC %: 0 %
PDW BLD-RTO: 12.7 % (ref 11.7–14.9)
PLATELET # BLD: 342 K/CU MM (ref 140–440)
PMV BLD AUTO: 10.2 FL (ref 7.5–11.1)
POTASSIUM SERPL-SCNC: 3.4 MMOL/L (ref 3.5–5.1)
PRO-BNP: 51.23 PG/ML
RBC # BLD: 5.05 M/CU MM (ref 4.2–5.4)
SEGMENTED NEUTROPHILS ABSOLUTE COUNT: 4.9 K/CU MM
SEGMENTED NEUTROPHILS RELATIVE PERCENT: 55.5 % (ref 36–66)
SODIUM BLD-SCNC: 136 MMOL/L (ref 135–145)
TOTAL CK: 67 IU/L (ref 26–140)
TOTAL IMMATURE NEUTOROPHIL: 0.01 K/CU MM
TOTAL NUCLEATED RBC: 0 K/CU MM
TOTAL PROTEIN: 7.8 GM/DL (ref 6.4–8.2)
TROPONIN T: <0.01 NG/ML
TSH HIGH SENSITIVITY: 1.54 UIU/ML (ref 0.27–4.2)
WBC # BLD: 8.8 K/CU MM (ref 4–10.5)

## 2020-04-16 PROCEDURE — 80053 COMPREHEN METABOLIC PANEL: CPT

## 2020-04-16 PROCEDURE — 96360 HYDRATION IV INFUSION INIT: CPT

## 2020-04-16 PROCEDURE — 83880 ASSAY OF NATRIURETIC PEPTIDE: CPT

## 2020-04-16 PROCEDURE — 84443 ASSAY THYROID STIM HORMONE: CPT

## 2020-04-16 PROCEDURE — 84702 CHORIONIC GONADOTROPIN TEST: CPT

## 2020-04-16 PROCEDURE — 85025 COMPLETE CBC W/AUTO DIFF WBC: CPT

## 2020-04-16 PROCEDURE — 2580000003 HC RX 258: Performed by: EMERGENCY MEDICINE

## 2020-04-16 PROCEDURE — 93005 ELECTROCARDIOGRAM TRACING: CPT | Performed by: EMERGENCY MEDICINE

## 2020-04-16 PROCEDURE — 71045 X-RAY EXAM CHEST 1 VIEW: CPT

## 2020-04-16 PROCEDURE — 84484 ASSAY OF TROPONIN QUANT: CPT

## 2020-04-16 PROCEDURE — 6370000000 HC RX 637 (ALT 250 FOR IP): Performed by: EMERGENCY MEDICINE

## 2020-04-16 PROCEDURE — 36415 COLL VENOUS BLD VENIPUNCTURE: CPT

## 2020-04-16 PROCEDURE — 83735 ASSAY OF MAGNESIUM: CPT

## 2020-04-16 PROCEDURE — 85379 FIBRIN DEGRADATION QUANT: CPT

## 2020-04-16 PROCEDURE — 82550 ASSAY OF CK (CPK): CPT

## 2020-04-16 PROCEDURE — 99285 EMERGENCY DEPT VISIT HI MDM: CPT

## 2020-04-16 RX ORDER — ASPIRIN 81 MG/1
324 TABLET, CHEWABLE ORAL ONCE
Status: COMPLETED | OUTPATIENT
Start: 2020-04-16 | End: 2020-04-16

## 2020-04-16 RX ORDER — 0.9 % SODIUM CHLORIDE 0.9 %
1000 INTRAVENOUS SOLUTION INTRAVENOUS ONCE
Status: COMPLETED | OUTPATIENT
Start: 2020-04-16 | End: 2020-04-16

## 2020-04-16 RX ADMIN — ASPIRIN 81 MG 324 MG: 81 TABLET ORAL at 15:13

## 2020-04-16 RX ADMIN — SODIUM CHLORIDE 1000 ML: 9 INJECTION, SOLUTION INTRAVENOUS at 15:34

## 2020-04-16 ASSESSMENT — PAIN SCALES - GENERAL
PAINLEVEL_OUTOF10: 2
PAINLEVEL_OUTOF10: 0
PAINLEVEL_OUTOF10: 0
PAINLEVEL_OUTOF10: 2

## 2020-04-16 ASSESSMENT — PAIN DESCRIPTION - ORIENTATION: ORIENTATION: LEFT;UPPER;MID

## 2020-04-16 ASSESSMENT — PAIN DESCRIPTION - LOCATION: LOCATION: CHEST

## 2020-04-16 ASSESSMENT — ENCOUNTER SYMPTOMS
GASTROINTESTINAL NEGATIVE: 1
ALLERGIC/IMMUNOLOGIC NEGATIVE: 1
SHORTNESS OF BREATH: 1
EYES NEGATIVE: 1

## 2020-04-16 ASSESSMENT — PAIN DESCRIPTION - PAIN TYPE: TYPE: ACUTE PAIN

## 2020-04-16 ASSESSMENT — PAIN DESCRIPTION - FREQUENCY: FREQUENCY: INTERMITTENT

## 2020-04-16 ASSESSMENT — PAIN DESCRIPTION - DESCRIPTORS: DESCRIPTORS: ACHING;PRESSURE

## 2020-04-16 ASSESSMENT — PAIN DESCRIPTION - ONSET: ONSET: ON-GOING

## 2020-04-16 NOTE — CONSULTS
Dictated --[id number]  [id number] Christy Machado to d/c home  F/u as outpatient  For an echo and stress test  If she continue to have symptoms will consider adding Inderal  Thanks

## 2020-04-16 NOTE — ED PROVIDER NOTES
1 tablet by mouth Daily 90 tablet 1    ibuprofen (ADVIL;MOTRIN) 600 MG tablet Take 1 tablet by mouth every 6 hours as needed for Pain 30 tablet 1       Nursing Notes Reviewed    VITAL SIGNS:  ED Triage Vitals   Enc Vitals Group      BP       Pulse       Resp       Temp       Temp src       SpO2       Weight       Height       Head Circumference       Peak Flow       Pain Score       Pain Loc       Pain Edu? Excl. in [address]? PHYSICAL EXAM:  Physical Exam  Vitals signs and nursing note reviewed. Constitutional:       General: She is not in acute distress. Appearance: Normal appearance. She is well-developed and well-groomed. She is not ill-appearing, toxic-appearing or diaphoretic. HENT:      Head: Normocephalic and atraumatic. Right Ear: External ear normal.      Left Ear: External ear normal.      Nose: No rhinorrhea. Mouth/Throat:      Mouth: Mucous membranes are moist.      Pharynx: No oropharyngeal exudate or posterior oropharyngeal erythema. Eyes:      General: No scleral icterus. Right eye: No discharge. Left eye: No discharge. Extraocular Movements: Extraocular movements intact. Conjunctiva/sclera: Conjunctivae normal.      Pupils: Pupils are equal, round, and reactive to light. Neck:      Musculoskeletal: Full passive range of motion without pain and normal range of motion. Normal range of motion. No edema, erythema, neck rigidity, crepitus or injury. Vascular: No JVD. Trachea: Phonation normal.   Cardiovascular:      Rate and Rhythm: Normal rate and regular rhythm. Pulses: Normal pulses. Heart sounds: Normal heart sounds. No murmur. No friction rub. No gallop. Pulmonary:      Effort: Pulmonary effort is normal. No respiratory distress. Breath sounds: Normal breath sounds. No stridor. No wheezing, rhonchi or rales. Chest:      Chest wall: No tenderness.    Abdominal:      General: Bowel sounds are normal. There is no Lymphocytes % 34.9 24 - 44 %    Monocytes % 6.7 (H) 0 - 4 %    Eosinophils % 2.2 0 - 3 %    Basophils % 0.6 0 - 1 %    Segs Absolute 4.9 K/CU MM    Lymphocytes Absolute 3.1 K/CU MM    Monocytes Absolute 0.6 K/CU MM    Eosinophils Absolute 0.2 K/CU MM    Basophils Absolute 0.1 K/CU MM    Nucleated RBC % 0.0 %    Total Nucleated RBC 0.0 K/CU MM    Total Immature Neutrophil 0.01 K/CU MM    Immature Neutrophil % 0.1 0 - 0.43 %   CMP   Result Value Ref Range    Sodium 136 135 - 145 MMOL/L    Potassium 3.4 (L) 3.5 - 5.1 MMOL/L    Chloride 99 99 - 110 mMol/L    CO2 26 21 - 32 MMOL/L    BUN 8 6 - 23 MG/DL    CREATININE 0.9 0.6 - 1.1 MG/DL    Glucose 116 (H) 70 - 99 MG/DL    Calcium 9.7 8.3 - 10.6 MG/DL    Alb 4.7 3.4 - 5.0 GM/DL    Total Protein 7.8 6.4 - 8.2 GM/DL    Total Bilirubin 0.4 0.0 - 1.0 MG/DL    ALT 16 10 - 40 U/L    AST 13 (L) 15 - 37 IU/L    Alkaline Phosphatase 80 40 - 129 IU/L    GFR Non-African American >60 >60 mL/min/1.73m2    GFR African American >60 >60 mL/min/1.73m2    Anion Gap 11 4 - 16   Troponin   Result Value Ref Range    Troponin T <0.010 <0.01 NG/ML   CK   Result Value Ref Range    Total CK 67 26 - 140 IU/L   Brain Natriuretic Peptide   Result Value Ref Range    Pro-BNP 51.23 <300 PG/ML   TSH without Reflex   Result Value Ref Range    TSH, High Sensitivity 1.540 0.270 - 4.20 uIu/ml   Magnesium   Result Value Ref Range    Magnesium 2.0 1.8 - 2.4 mg/dl   HCG Serum, Quantitative   Result Value Ref Range    hCG Quant <0.5                                          UIU/ML    hCG Quant EXPECTED VALUES IN PREGNANCY UIU/ML    hCG Quant    7-50               0.2-1 WEEK UIU/ML    hCG Quant                 1-2 WEEKS UIU/ML    hCG Quant    100-5000           2-3 WEEKS UIU/ML    hCG Quant    500-10,000         3-4 WEEKS UIU/ML    hCG Quant    1000-50,000        4-5 WEEKS UIU/ML    hCG Quant    10,000-100,000     5-6 WEEKS UIU/ML    hCG Quant    15,000-200,000     6-8 WEEKS UIU/ML    hCG Quant per cardiology's recommendation.     CLINICAL IMPRESSION:  Final diagnoses:   Chest pain, unspecified type       (Please note that portions of this note may have been completed with a voice recognition program. Efforts were made to edit the dictations but occasionally words aremis-transcribed.)    DISPOSITION REFERRAL (if applicable):  Costella Holter, MD  [place] [address]  [phone]    Schedule an appointment as soon as possible for a visit in 1 day      [facility] Emergency Department  [address] [place] [id number] [phone]    If symptoms worsen    Noreen Jo MD  [address] [place] [address], [address] [place] [address]  [phone]    Schedule an appointment as soon as possible for a visit in 1 day  Cardiology      DISPOSITION MEDICATIONS (if applicable):  New Prescriptions    No medications on file          Karey Skinner, [facility],   04/16/20 [id number]

## 2020-04-17 PROCEDURE — 93010 ELECTROCARDIOGRAM REPORT: CPT | Performed by: INTERNAL MEDICINE

## 2020-04-28 ENCOUNTER — TELEPHONE (OUTPATIENT)
Dept: FAMILY MEDICINE CLINIC | Age: 30
End: 2020-04-28

## 2020-04-28 LAB
EKG ATRIAL RATE: 99 BPM
EKG DIAGNOSIS: NORMAL
EKG P AXIS: 50 DEGREES
EKG P-R INTERVAL: 144 MS
EKG Q-T INTERVAL: 336 MS
EKG QRS DURATION: 90 MS
EKG QTC CALCULATION (BAZETT): 431 MS
EKG R AXIS: 61 DEGREES
EKG T AXIS: 52 DEGREES
EKG VENTRICULAR RATE: 99 BPM

## 2020-08-06 ENCOUNTER — TELEPHONE (OUTPATIENT)
Dept: FAMILY MEDICINE CLINIC | Age: 30
End: 2020-08-06

## 2020-08-06 ENCOUNTER — PATIENT MESSAGE (OUTPATIENT)
Dept: FAMILY MEDICINE CLINIC | Age: 30
End: 2020-08-06

## 2020-08-06 NOTE — TELEPHONE ENCOUNTER
Call Dr. Sameera Lindo. Thank you for helping with my patient. We will try to get the MRI but they may refuse as I did not see patient in the office or make the diagnosis myself.

## 2020-08-06 NOTE — TELEPHONE ENCOUNTER
Dr Mando Wick called [facility] pt. optic nerves swollen, dr Mando Wick suspects papilledema rec : dr Mukund Parada order an mri

## 2020-08-07 ENCOUNTER — TELEPHONE (OUTPATIENT)
Dept: FAMILY MEDICINE CLINIC | Age: 30
End: 2020-08-07

## 2020-08-07 NOTE — TELEPHONE ENCOUNTER
Contacted insurance about MRI Mo can order it  but does require prior auth-- need to gather documents for why this is needed and the eye doctor will fax over her information that is also needed for the prior auth

## 2020-08-07 NOTE — TELEPHONE ENCOUNTER
Spoke with pt. Informed per dr Sivan Plaza note.  Asked pt if she could have dr Comfort Das fax her office notes to our office

## 2020-08-07 NOTE — TELEPHONE ENCOUNTER
From: Chai Samples  To: Cynthia Major MD  Sent: 8/6/2020 9:04 PM EDT  Subject: Visit Follow-Up Question    I went ahead with the eye doctors appt. she found my right eye had gotten worse so she updated my contact prescription. She also dilated my eyes (I hadnt had them dilated in 8 years) she noticed my optic nerves were more prominent then they should be (Im not familiar with eyes or any of what that means) but she wanted to call you and talk with you about setting me up with an mri and a lumbar puncture. Im guessing shes Maybe looking for ICP or if anything serious is causing this, the fact that Im female  and 30 she said are risk factors enough along with weight gain? I just wanted to let you know and see how you want to proceed. if you have any questions call Dr Sally Landers at [facility] eye care. Thank you.

## 2020-08-14 ENCOUNTER — HOSPITAL ENCOUNTER (OUTPATIENT)
Dept: MRI IMAGING | Age: 30
Discharge: HOME | End: 2020-08-14
Payer: COMMERCIAL

## 2020-08-14 PROCEDURE — 70552 MRI BRAIN STEM W/DYE: CPT

## 2020-08-14 PROCEDURE — A9579 GAD-BASE MR CONTRAST NOS,1ML: HCPCS | Performed by: FAMILY MEDICINE

## 2020-08-14 PROCEDURE — 6360000004 HC RX CONTRAST MEDICATION: Performed by: FAMILY MEDICINE

## 2020-08-14 RX ADMIN — GADOTERIDOL 20 ML: 279.3 INJECTION, SOLUTION INTRAVENOUS at 10:35

## 2020-09-08 ENCOUNTER — TELEPHONE (OUTPATIENT)
Dept: FAMILY MEDICINE CLINIC | Age: 30
End: 2020-09-08

## 2020-09-11 ENCOUNTER — OFFICE VISIT (OUTPATIENT)
Dept: FAMILY MEDICINE CLINIC | Age: 30
End: 2020-09-11
Payer: COMMERCIAL

## 2020-09-11 VITALS
DIASTOLIC BLOOD PRESSURE: 82 MMHG | SYSTOLIC BLOOD PRESSURE: 118 MMHG | TEMPERATURE: 97.2 F | BODY MASS INDEX: 33.74 KG/M2 | WEIGHT: 215 LBS | HEART RATE: 103 BPM | HEIGHT: 67 IN

## 2020-09-11 PROCEDURE — 99214 OFFICE O/P EST MOD 30 MIN: CPT | Performed by: FAMILY MEDICINE

## 2020-09-11 RX ORDER — FLUOXETINE HYDROCHLORIDE 20 MG/1
20 CAPSULE ORAL DAILY
COMMUNITY

## 2020-09-13 NOTE — PROGRESS NOTES
9/13/2020    Jomar Hernadez    Chief Complaint   Patient presents with    6 Month Follow-Up    Headache     daily headaches       HPI    Saray Ram is a 27 y.o. female who presents today with follow-up. Patient with chronic daily headaches. Her bilateral frontal get worse around 3 PM while or after taking a nap. 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 She is staying at home with her 1 son who is in  at half days. Symptoms have been for about 5 months there is no visual scotomata there is questionable photophobia otherwise no red flags. She has a negative review of systems for migraines. Patient has a history of significant anxiety although nothing particularly aggravating that at this time. Patient notes compliance with her thyroid medication. She denies side effect of it. She wishes for refill. REVIEW OF SYSTEMS    Constitutional:  Denies fever, chills, weight loss or weakness  Eyes:  no photophobia or discharge  ENT:  no sore throat or ear pain  Cardiovascular:  Denies chest pain, palpitations or swelling  Respiratory:  Denies cough or shortness of breath  GI:  no abdominal pain, nausea, vomiting, or diarrhea  Musculoskeletal:  no back pain  Skin:  No rashes  Neurologic:  no headache, focal weakness, or sensory changes  Endocrine:  no polyuria or polydipsia      PAST MEDICAL HISTORY  No past medical history on file.     FAMILY HISTORY  Family History   Problem Relation Age of Onset   [de-identified] / [place] Mother     High Blood Pressure Father     High Cholesterol Father        SOCIAL HISTORY  Social History     Socioeconomic History    Marital status:      Spouse name: Not on file    Number of children: Not on file    Years of education: Not on file    Highest education level: Not on file   Occupational History    Not on file   Social Needs    Financial resource strain: Not on file    Food insecurity     Worry: Not on file     Inability: Not on file   Channing Bañuelos Transportation needs     Medical: Not on file     Non-medical: Not on file   Tobacco Use    Smoking status: Former Smoker     Packs/day: 0.25     Years: 9.00     Pack years: 2.25     Start date:      Last attempt to quit: 2019     Years since quittin.3    Smokeless tobacco: Never Used   Substance and Sexual Activity    Alcohol use: Yes     Alcohol/week: 1.0 - 2.0 standard drinks     Types: 1 - 2 Cans of beer per week     Drinks per session: 1 or 2     Binge frequency: Weekly    Drug use: No    Sexual activity: Yes     Partners: Male   Lifestyle    Physical activity     Days per week: Not on file     Minutes per session: Not on file    Stress: Not on file   Relationships    Social connections     Talks on phone: Not on file     Gets together: Not on file     Attends Denominational service: Not on file     Active member of club or organization: Not on file     Attends meetings of clubs or organizations: Not on file     Relationship status: Not on file    Intimate partner violence     Fear of current or ex partner: Not on file     Emotionally abused: Not on file     Physically abused: Not on file     Forced sexual activity: Not on file   Other Topics Concern    Not on file   Social History Narrative    Not on file        SURGICAL HISTORY  No past surgical history on file. CURRENT MEDICATIONS  Current Outpatient Medications   Medication Sig Dispense Refill    FLUoxetine (PROZAC) 20 MG capsule Take 20 mg by mouth daily      levothyroxine (SYNTHROID) 50 MCG tablet Take 1 tablet by mouth Daily 90 tablet 1    ibuprofen (ADVIL;MOTRIN) 600 MG tablet Take 1 tablet by mouth every 6 hours as needed for Pain 30 tablet 1     No current facility-administered medications for this visit.         ALLERGIES  Allergies   Allergen Reactions    Codeine Palpitations       PHYSICAL EXAM    /82   Pulse 103   Temp 97.2 °F (36.2 °C)   Ht 5' 7\" (1.702 m)   Wt 215 lb (97.5 kg)   BMI 33.67 kg/m² Constitutional:  Well developed, well nourished  HEENT:  Normocephalic, atraumatic, bilateral external ears normal, oropharynx moist, nose normal  Neck:  Normal range of motion, no tenderness, supple  Lymphatic:  No lymphadenopathy noted  Cardiovascular:  Normal heart rate, S1S2 nl  Thorax & Lungs:  Normal breath sounds, no respiratory distress, no wheezing  Skin:  Warm, dry, no erythema, no rash  Back:  straight  Extremities:  No edema, no tenderness, no cyanosis  Musculoskeletal:  Good range of motion   Neurologic:  Alert & oriented X 3      ASSESSMENT & PLAN    1. Chronic tension-type headache, not intractable  Recommended heat and stretching to her trapezius and thoracic paraspinal muscles. Taught her range of motion exercises for her shoulders and  an arm bar. Patient do these the count of 20 and repeat and also do them twice a day for 10-day. Then as needed is helpful    2. Acquired hypothyroidism  Issue is stable check labs today. Adjust medication off of lab results. 3. ISRRAEL (generalized anxiety disorder)  Questionable control. Continue same meds. Continue counseling.     Follow-up 6 months or earlier PRN       Electronically signed by Myles Whaley MD on 9/13/2020

## 2020-09-14 RX ORDER — LEVOTHYROXINE SODIUM 0.05 MG/1
TABLET ORAL
Qty: 90 TABLET | Refills: 1 | Status: SHIPPED | OUTPATIENT
Start: 2020-09-14 | End: 2021-03-10 | Stop reason: SDUPTHER

## 2020-09-14 NOTE — TELEPHONE ENCOUNTER
Requested Prescriptions     Signed Prescriptions Disp Refills    levothyroxine (SYNTHROID) 50 MCG tablet 90 tablet 1     Sig: TAKE ONE TABLET BY MOUTH DAILY     Authorizing Provider: Kaylin Key     Ordering User: Pablo Berrios

## 2020-09-22 ENCOUNTER — TELEPHONE (OUTPATIENT)
Dept: FAMILY MEDICINE CLINIC | Age: 30
End: 2020-09-22

## 2020-09-22 NOTE — TELEPHONE ENCOUNTER
MRI will need a retroactive prior authorization-----please call this number to the patient's insurance company-      Phone:  [phone]    Patient's Claim ID#   [id number]      MRI for 8/14/20 (Date of Service)      Patient has already been billed so could this be done very quickly?  (Her bill is for $1700.00 and she can't afford that)

## 2020-10-01 ENCOUNTER — TELEPHONE (OUTPATIENT)
Dept: FAMILY MEDICINE CLINIC | Age: 30
End: 2020-10-01

## 2020-10-01 NOTE — TELEPHONE ENCOUNTER
Mariola Sanchez can you take a look at patient's request.  She is asking about a prior authorization for an MRI that was done 6 weeks ago. I do not understand. If you can help her a great.

## 2020-10-01 NOTE — TELEPHONE ENCOUNTER
Patient will need Prior Authorization for an MRI ----this can be submitted through:    [email or web address]    Phone: [phone] ([address] [place] [address])    For the 8/14/20 MRI patient had done.

## 2020-10-06 ENCOUNTER — TELEPHONE (OUTPATIENT)
Dept: FAMILY MEDICINE CLINIC | Age: 30
End: 2020-10-06

## 2020-10-06 NOTE — TELEPHONE ENCOUNTER
I spoke with Alea Moyer in regards to getting a post authorization for MRI Brain W Contrast completed on 8/14/2020. She is concerned with receiving a $2,000 bill when she paid $500 before the test was completed. I spoke with a rep from our prior authorization department and was informed that someone is working on getting the post authorization. I informed Alea Moyer of this and she voiced understanding.

## 2020-11-18 ENCOUNTER — PATIENT MESSAGE (OUTPATIENT)
Dept: FAMILY MEDICINE CLINIC | Age: 30
End: 2020-11-18

## 2020-11-19 RX ORDER — OMEPRAZOLE 40 MG/1
40 CAPSULE, DELAYED RELEASE ORAL
Qty: 30 CAPSULE | Refills: 5 | Status: SHIPPED | OUTPATIENT
Start: 2020-11-19 | End: 2021-03-10 | Stop reason: SDUPTHER

## 2020-11-19 NOTE — TELEPHONE ENCOUNTER
Requested Prescriptions     Signed Prescriptions Disp Refills    omeprazole (PRILOSEC) 40 MG delayed release capsule 30 capsule 5     Sig: Take 1 capsule by mouth every morning (before breakfast)     Authorizing Provider: Iza Evans     Ordering User: Yazmin Chou

## 2020-11-19 NOTE — TELEPHONE ENCOUNTER
From: Mono Perkins  To: Favio Chew MD  Sent: 11/18/2020 6:11 PM EST  Subject: Prescription Question    Hello, I got a hold of my pharmacy today, they mentioned a Prilosec order was never received, if you could resend that, that would be great. Sorry for the inconvenience.

## 2021-03-10 ENCOUNTER — TELEMEDICINE (OUTPATIENT)
Dept: FAMILY MEDICINE CLINIC | Age: 31
End: 2021-03-10
Payer: COMMERCIAL

## 2021-03-10 DIAGNOSIS — E03.9 ACQUIRED HYPOTHYROIDISM: Primary | ICD-10-CM

## 2021-03-10 DIAGNOSIS — E66.09 CLASS 1 OBESITY DUE TO EXCESS CALORIES WITHOUT SERIOUS COMORBIDITY WITH BODY MASS INDEX (BMI) OF 33.0 TO 33.9 IN ADULT: ICD-10-CM

## 2021-03-10 DIAGNOSIS — F41.1 GAD (GENERALIZED ANXIETY DISORDER): ICD-10-CM

## 2021-03-10 PROCEDURE — 99214 OFFICE O/P EST MOD 30 MIN: CPT | Performed by: FAMILY MEDICINE

## 2021-03-10 RX ORDER — OMEPRAZOLE 40 MG/1
40 CAPSULE, DELAYED RELEASE ORAL
Qty: 90 CAPSULE | Refills: 1 | Status: SHIPPED | OUTPATIENT
Start: 2021-03-10 | End: 2021-12-03

## 2021-03-10 RX ORDER — CLONAZEPAM 0.5 MG/1
0.5 TABLET ORAL DAILY PRN
COMMUNITY

## 2021-03-10 RX ORDER — LEVOTHYROXINE SODIUM 0.05 MG/1
TABLET ORAL
Qty: 90 TABLET | Refills: 1 | Status: SHIPPED | OUTPATIENT
Start: 2021-03-10 | End: 2021-09-13 | Stop reason: SDUPTHER

## 2021-03-10 ASSESSMENT — PATIENT HEALTH QUESTIONNAIRE - PHQ9
SUM OF ALL RESPONSES TO PHQ QUESTIONS 1-9: 0
2. FEELING DOWN, DEPRESSED OR HOPELESS: 0
1. LITTLE INTEREST OR PLEASURE IN DOING THINGS: 0
SUM OF ALL RESPONSES TO PHQ9 QUESTIONS 1 & 2: 0
SUM OF ALL RESPONSES TO PHQ QUESTIONS 1-9: 0

## 2021-03-10 NOTE — PROGRESS NOTES
3/10/2021    TELEHEALTH EVALUATION -- Audio/Visual (During NHBRW-59 public health emergency) in [place]    HPI:    Virgilio Rosenberg (:  1990) has requested an audio/video evaluation for the following concern(s):    Patient states that she had lab work done by her OB/GYN and that her TSH was normal just 1 month ago. Patient was asking if a change in her levothyroxine generic towards the old-fashioned thyroid medicine her d.a.w. Synthroid would be beneficial.  I do not feel that those would be the right moves for her at this point. Patient notes significant somnolence and snoring and her OB/GYN to set her up for sleep apnea testing. Patient notes weight gain. Her BMI is 33.67. Patient to continue to work with exercise and diet. Patient feels her anxiety is stable. I suggested adding yoga follow-up to her regular exercise and counseling on Prozac routine. REVIEW OF SYSTEMS    Constitutional:  Denies fever, chills, weight loss or weakness  Eyes:  no photophobia or discharge  ENT:  no sore throat or ear pain  Cardiovascular:  Denies chest pain, palpitations or swelling  Respiratory:  Denies cough or shortness of breath  GI:  no abdominal pain, nausea, vomiting, or diarrhea  Musculoskeletal:  no back pain  Skin:  No rashes  Neurologic:  no headache, focal weakness, or sensory changes  Endocrine:  no polyuria or polydipsia        Prior to Visit Medications    Medication Sig Taking? Authorizing Provider   clonazePAM (KLONOPIN) 0.5 MG tablet Take 0.5 mg by mouth daily as needed.  Dr Lucila Khan Yes Monica Stephens MD   omeprazole (PRILOSEC) 40 MG delayed release capsule Take 1 capsule by mouth every morning (before breakfast) Yes Kelly Burger MD   levothyroxine (SYNTHROID) 50 MCG tablet TAKE ONE TABLET BY MOUTH DAILY Yes Kelly Burger MD   FLUoxetine (PROZAC) 20 MG capsule Take 20 mg by mouth daily Yes Monica Stephens MD   ibuprofen (ADVIL;MOTRIN) 600 MG tablet Take 1 tablet by mouth every 6 hours as needed for Pain Yes Aleah Romeo PA-C       Social History     Tobacco Use    Smoking status: Former Smoker     Packs/day: 0.25     Years: 9.00     Pack years: 2.25     Start date:      Quit date: 2019     Years since quittin.8    Smokeless tobacco: Never Used   Substance Use Topics    Alcohol use: Yes     Alcohol/week: 1.0 - 2.0 standard drinks     Types: 1 - 2 Cans of beer per week     Drinks per session: 1 or 2     Binge frequency: Weekly    Drug use: No        No past medical history on file., No past surgical history on file. PHYSICAL EXAM  There were no vitals taken for this visit. ASSESSMENT/PLAN:  1. Acquired hypothyroidism  Insert controlled. See discussion above. Continue the same.  - levothyroxine (SYNTHROID) 50 MCG tablet; TAKE ONE TABLET BY MOUTH DAILY  Dispense: 90 tablet; Refill: 1    2. ISRRAEL (generalized anxiety disorder)  Continue Prozac, counseling once monthly and regular exercise. Add Pilates and/or yoga. 3. Class 1 obesity due to excess calories without serious comorbidity with body mass index (BMI) of 33.0 to 33.9 in adult  Continue to improve with nutrition and exercise. Return in about 6 months (around 9/10/2021). Yazmin Veliz is a 27 y.o. female being evaluated by a Virtual Visit (video visit) encounter to address concerns as mentioned above. A caregiver was present when appropriate. Due to this being a TeleHealth encounter (During RJFZQ-07 public health emergency), evaluation of the following organ systems was limited: Vitals/Constitutional/EENT/Resp/CV/GI//MS/Neuro/Skin/Heme-Lymph-Imm.   Pursuant to the emergency declaration under the [facility], [address] [place] [address] [facility] authority and the [employer] Act, this Virtual Visit was conducted with patient's (and/or legal guardian's) consent, to reduce the patient's risk of exposure to COVID-19 and provide necessary medical care. The patient (and/or legal guardian) has also been advised to contact this office for worsening conditions or problems, and seek emergency medical treatment and/or call 911 if deemed necessary. Patient identification was verified at the start of the visit: Yes    Total time spent on this encounter: Not billed by time    Services were provided through a video synchronous discussion virtually to substitute for in-person clinic visit. Patient and provider were located at their individual homes. --Greg Aragon MD on 3/10/2021 at 11:41 AM    An electronic signature was used to authenticate this note.

## 2021-03-11 ENCOUNTER — HOSPITAL ENCOUNTER (OUTPATIENT)
Dept: SLEEP CENTER | Age: 31
Discharge: HOME | End: 2021-03-11
Payer: COMMERCIAL

## 2021-03-11 VITALS — BODY MASS INDEX: 31.07 KG/M2 | HEIGHT: 68 IN | WEIGHT: 205 LBS

## 2021-03-11 DIAGNOSIS — Z87.891 EX-SMOKER: ICD-10-CM

## 2021-03-11 DIAGNOSIS — G47.33 OSA (OBSTRUCTIVE SLEEP APNEA): ICD-10-CM

## 2021-03-11 DIAGNOSIS — G47.10 HYPERSOMNIA: ICD-10-CM

## 2021-03-11 DIAGNOSIS — E66.9 OBESITY (BMI 30-39.9): ICD-10-CM

## 2021-03-11 PROCEDURE — 99202 OFFICE O/P NEW SF 15 MIN: CPT | Performed by: INTERNAL MEDICINE

## 2021-03-11 PROCEDURE — 99211 OFF/OP EST MAY X REQ PHY/QHP: CPT

## 2021-03-11 ASSESSMENT — SLEEP AND FATIGUE QUESTIONNAIRES
HOW LIKELY ARE YOU TO NOD OFF OR FALL ASLEEP WHILE WATCHING TV: 0
HOW LIKELY ARE YOU TO NOD OFF OR FALL ASLEEP IN A CAR, WHILE STOPPED FOR A FEW MINUTES IN TRAFFIC: 0
HOW LIKELY ARE YOU TO NOD OFF OR FALL ASLEEP WHILE SITTING AND TALKING TO SOMEONE: 0
HOW LIKELY ARE YOU TO NOD OFF OR FALL ASLEEP WHILE SITTING QUIETLY AFTER LUNCH WITHOUT ALCOHOL: 1

## 2021-03-11 NOTE — CONSULTS
Frida John MD, Nancy Field MD, Malika Solis MD, [facility]      [address] [facility]. [address], [address] [facility]   PH: [phone]  F: [phone]     Subjective:     Patient ID: Sakina Carrero is a 27 y.o. female, referred to the sleep center for   Chief Complaint   Patient presents with    Fatigue    Snoring   .     Referring physician:  Dr. Abida Fuller    History: is doing a VIDEO CONSULT    Symptoms:   [x]  Snoring                                                                    []  Dry Mouth  [x]  Choking                                                                   [x]  Morning Headaches  [x]  Gasping for Air                                                        []  Trouble Falling asleep  [x]  Tired during the daytime                                         []  Trouble Staying Asleep  [x]  Tired when you wake up                                         [x]  Weight Gain in Last 5 Years  [x]  Wake up frequently at night                                    []  Weight Loss in Last 5 Years  []  Shortness Of Breath                                               []  Shift Worker  []  Coughing                                                                [x]  Smoker (Previous or Current) 5-6 cigs/day x 9 yrs  []  Chest Pain                                                              [x]  Anxiety  []  Trouble keeping your legs still at night                   []  Depression  []  Kicking your legs in your sleep                               []  Insomnia       [x]  Stop breathing  [x]  Palpitations       []  Other:     Significant Co-morbidities:  []  Congestive Heart Failure     []  COPD         []  Stroke (Past 30 Days)      []  Supplemental Oxygen Usage       []  Cognitive Impairment      []  Neuromuscular Problems  []  Epilepsy/Neurological Disorders             Social History     Socioeconomic History    Marital status:      Spouse name: Not on file    Number of children: Not on file    Years of education: Not on file    Highest education level: Not on file   Occupational History    Not on file   Social Needs    Financial resource strain: Not hard at all    Food insecurity     Worry: Never true     Inability: Never true    Transportation needs     Medical: No     Non-medical: No   Tobacco Use    Smoking status: Former Smoker     Packs/day: 0.25     Years: 9.00     Pack years: 2.25     Start date:      Quit date: 2019     Years since quittin.8    Smokeless tobacco: Never Used   Substance and Sexual Activity    Alcohol use: Yes     Alcohol/week: 1.0 - 2.0 standard drinks     Types: 1 - 2 Cans of beer per week     Drinks per session: 1 or 2     Binge frequency: Weekly    Drug use: No    Sexual activity: Yes     Partners: Male   Lifestyle    Physical activity     Days per week: Not on file     Minutes per session: Not on file    Stress: Not on file   Relationships    Social connections     Talks on phone: Not on file     Gets together: Not on file     Attends Mosque service: Not on file     Active member of club or organization: Not on file     Attends meetings of clubs or organizations: Not on file     Relationship status: Not on file    Intimate partner violence     Fear of current or ex partner: Not on file     Emotionally abused: Not on file     Physically abused: Not on file     Forced sexual activity: Not on file   Other Topics Concern    Not on file   Social History Narrative    Not on file       Prior to Admission medications    Medication Sig Start Date End Date Taking? Authorizing Provider   clonazePAM (KLONOPIN) 0.5 MG tablet Take 0.5 mg by mouth daily as needed.  Dr Motta    Yes Kyra Gomez MD   omeprazole (PRILOSEC) 40 MG delayed release capsule Take 1 capsule by mouth every morning (before breakfast) 3/10/21 6/8/21 Yes Damion Fajardo MD   levothyroxine (SYNTHROID) 50 MCG tablet TAKE ONE TABLET BY MOUTH DAILY 3/10/21 6/10/21 Yes Leigh Ann Jordan MD   FLUoxetine (PROZAC) 20 MG capsule Take 20 mg by mouth daily   Yes Kristian Green MD   ibuprofen (ADVIL;MOTRIN) 600 MG tablet Take 1 tablet by mouth every 6 hours as needed for Pain 12/17/16  Yes Jyoti Escamilla PA-C       Allergies as of 03/11/2021 - Review Complete 03/11/2021   Allergen Reaction Noted    Codeine Palpitations 07/31/2014       Patient Active Problem List   Diagnosis    MRSA (methicillin resistant staph aureus) culture positive    Abscess of right breast    Acquired hypothyroidism    Attention deficit disorder (ADD) without hyperactivity    MICHEL (obstructive sleep apnea)    Obesity (BMI 30-39. 9)    Hypersomnia    Ex-smoker       No past medical history on file. No past surgical history on file. Family History   Problem Relation Age of Onset   [de-identified] / Stillbirths Mother     High Blood Pressure Father     High Cholesterol Father          Objective:   Ht 5' 8\" (1.727 m)   Wt 205 lb (93 kg)   BMI 31.17 kg/m²   Body mass index is 31.17 kg/m². Sleep Medicine 3/11/2021   Sitting and reading 1   Watching TV 0   Sitting, inactive in a public place (e.g. a theatre or a meeting) 1   As a passenger in a car for an hour without a break 2   Lying down to rest in the afternoon when circumstances permit 3   Sitting and talking to someone 0   Sitting quietly after a lunch without alcohol 1   In a car, while stopped for a few minutes in traffic 0   Total score 8       Vitals:    03/11/21 0926   Weight: 205 lb (93 kg)   Height: 5' 8\" (1.727 m)        Inches  [place] - Total score: 8    Gen: No distress. Eyes: PERRL. No sclera icterus. No conjunctival injection. ENT: No discharge. Pharynx clear. External appearance of ears and nose normal.  Neck: Trachea midline. No obvious mass. Resp: No accessory muscle use. No crackles. No wheezes. No rhonchi.  No dullness on percussion. CV: Regular rate. Regular rhythm. No murmur or rub. No edema. GI: Non-tender. Non-distended. No hernia. Skin: Warm, dry, normal texture and turgor. No nodule on exposed extremities. Lymph: No cervical LAD. No supraclavicular LAD. M/S: No cyanosis. No clubbing. No joint deformity. Psych: Oriented x 3. No anxiety. Awake. Alert. Intact judgement and insight. Mallampati Airway Classification:   []1 []2 [x]3 []4        Assessment and Plan     Diagnosis:    Problem List        Pulmonary Problems    MICHEL (obstructive sleep apnea)     She has all the symptoms of MICHEL  Advised to do the HST  Loose weight         Relevant Orders    Home Sleep Study       Other    Obesity (BMI 30-39. 9)     Advised to loose weight with diet and exercise           Hypersomnia     Advised to go for the HST  Loose weight         Ex-smoker     Advised to c/w quitting smoking                     Additional Plan:     [x]  Sleep hygiene/ relaxation methods & CBTi principles review with patient   [x]  Avoid supine/back sleep until sleep study   [x]  Driving precautions   [x]  Medical consequences of untreated MICHEL   [x]  Weight loss recommendations   [x]  Diet recommendations   [x]  Exercise   [x]  Advised to quit smoking       []  PFT referral   []  Bariatric Program referral      Follow-Up:    No follow-ups on file. Bouchra Fang is a 27 y.o. female being evaluated by a Virtual Visit (video visit) encounter to address concerns as mentioned above. A caregiver was present when appropriate. Due to this being a TeleHealth encounter (During [place] [address] public health emergency), evaluation of the following organ systems was limited: Vitals/Constitutional/EENT/Resp/CV/GI//MS/Neuro/Skin/Heme-Lymph-Imm.   Pursuant to the emergency declaration under the [address] [facility] [address] waiver authority and the [employer] and [employer] Act, this Virtual Visit was conducted with patient's (and/or legal guardian's) consent, to reduce the patient's risk of exposure to COVID-19 and provide necessary medical care. The patient (and/or legal guardian) has also been advised to contact this office for worsening conditions or problems, and seek emergency medical treatment and/or call 911 if deemed necessary. Patient identification was verified at the start of the visit: Yes    Total time spent for this encounter: 21 minutes    Services were provided through a video synchronous discussion virtually to substitute for in-person clinic visit. Patient and provider were located at their individual homes. --Jesusita Hayes MD on 3/11/2021 at 9:36 AM    An electronic signature was used to authenticate this note.      Electronically signed by Jesusita Hayes MD on 3/11/2021 at 9:36 AM

## 2021-03-22 ENCOUNTER — HOSPITAL ENCOUNTER (OUTPATIENT)
Dept: SLEEP CENTER | Age: 31
Discharge: HOME | End: 2021-03-22
Payer: COMMERCIAL

## 2021-03-22 PROCEDURE — G0398 HOME SLEEP TEST/TYPE 2 PORTA: HCPCS

## 2021-03-22 NOTE — PROGRESS NOTES
Sakina Carrero  1990  arrived at [address] on 3/22/2021 for set up and instruction of home sleep study with the [facility] unit.  she was instructed on proper set-up and operation of HST unit. Written instructions with set up diagram given for reference and reinforcement of verbal instruction and demonstration. she was able to return demonstration appropriately. No home environment, vision, dexterity, comprehension concerns with this patient based on completed forms and patient interactions. Patient will return unit after 2 nights as instructed.     Electronically signed by Brian Beavers on 3/22/2021 at 9:22 AM

## 2021-03-31 ENCOUNTER — HOSPITAL ENCOUNTER (OUTPATIENT)
Dept: SLEEP CENTER | Age: 31
Discharge: HOME | End: 2021-03-31
Payer: COMMERCIAL

## 2021-03-31 DIAGNOSIS — G47.33 OSA (OBSTRUCTIVE SLEEP APNEA): ICD-10-CM

## 2021-03-31 DIAGNOSIS — E66.9 OBESITY (BMI 30-39.9): ICD-10-CM

## 2021-03-31 DIAGNOSIS — G47.10 HYPERSOMNIA: ICD-10-CM

## 2021-03-31 DIAGNOSIS — Z87.891 EX-SMOKER: ICD-10-CM

## 2021-03-31 PROCEDURE — 95806 SLEEP STUDY UNATT&RESP EFFT: CPT | Performed by: INTERNAL MEDICINE

## 2021-03-31 PROCEDURE — 99213 OFFICE O/P EST LOW 20 MIN: CPT | Performed by: INTERNAL MEDICINE

## 2021-03-31 PROCEDURE — 9990000010 HC NO CHARGE VISIT

## 2021-03-31 ASSESSMENT — ENCOUNTER SYMPTOMS
EYE ITCHING: 0
COUGH: 0
SHORTNESS OF BREATH: 0
ABDOMINAL DISTENTION: 0
ABDOMINAL PAIN: 0
EYE DISCHARGE: 0
BACK PAIN: 0

## 2021-03-31 NOTE — PROGRESS NOTES
2.0 standard drinks     Types: 1 - 2 Cans of beer per week     Drinks per session: 1 or 2     Binge frequency: Weekly    Drug use: No    Sexual activity: Yes     Partners: Male   Lifestyle    Physical activity     Days per week: Not on file     Minutes per session: Not on file    Stress: Not on file   Relationships    Social connections     Talks on phone: Not on file     Gets together: Not on file     Attends Faith service: Not on file     Active member of club or organization: Not on file     Attends meetings of clubs or organizations: Not on file     Relationship status: Not on file    Intimate partner violence     Fear of current or ex partner: Not on file     Emotionally abused: Not on file     Physically abused: Not on file     Forced sexual activity: Not on file   Other Topics Concern    Not on file   Social History Narrative    Not on file       Review of Systems   Constitutional: Positive for fatigue. HENT: Negative for congestion and postnasal drip. Eyes: Negative for discharge and itching. Respiratory: Negative for cough and shortness of breath. Cardiovascular: Negative for chest pain and leg swelling. Gastrointestinal: Negative for abdominal distention and abdominal pain. Endocrine: Negative for cold intolerance and heat intolerance. Genitourinary: Negative for enuresis and frequency. Musculoskeletal: Negative for arthralgias and back pain. Allergic/Immunologic: Negative for environmental allergies and food allergies. Neurological: Negative for numbness and headaches. Hematological: Negative for adenopathy. Psychiatric/Behavioral: Negative for agitation and behavioral problems. Objective: There were no vitals taken for this visit. There is no height or weight on file to calculate BMI.   Sleep Medicine 3/11/2021   Sitting and reading 1   Watching TV 0   Sitting, inactive in a public place (e.g. a theatre or a meeting) 1   As a passenger in a car for an hour without a break 2   Lying down to rest in the afternoon when circumstances permit 3   Sitting and talking to someone 0   Sitting quietly after a lunch without alcohol 1   In a car, while stopped for a few minutes in traffic 0   Total score 8       Radiology: None    Assessment and Plan     Problem List        Pulmonary Problems    MICHEL (obstructive sleep apnea)     She has no MICHEL but has EDS  Advised to go for the PSG and MSLT  Loose weight            Other    Obesity (BMI 30-39. 9)     Advised to loose weight with diet and exercise           Hypersomnia     Advised to go for the PSG and MSLT  Loose weight         Relevant Orders    Baseline Diagnostic Sleep Study    Assessment of Daytime Sleepiness    Ex-smoker     Advised to c/w quitting smoking                    No follow-ups on file. Progress notes sent to the referring Provider    Priti Hernadez is a 27 y.o. female being evaluated by a Virtual Visit (video visit) encounter to address concerns as mentioned above. A caregiver was present when appropriate. Due to this being a TeleHealth encounter (During [facility] public health emergency), evaluation of the following organ systems was limited: Vitals/Constitutional/EENT/Resp/CV/GI//MS/Neuro/Skin/Heme-Lymph-Imm. Pursuant to the emergency declaration under the [address] [place] [address], [address] [place] [address] authority and the [employer] and [employer] Act, this Virtual Visit was conducted with patient's (and/or legal guardian's) consent, to reduce the patient's risk of exposure to COVID-19 and provide necessary medical care. The patient (and/or legal guardian) has also been advised to contact this office for worsening conditions or problems, and seek emergency medical treatment and/or call 911 if deemed necessary.      Patient identification was verified at the start of the visit: Yes    Total time spent for this encounter: 21 minutes    Services were provided through a video synchronous discussion virtually to substitute for in-person clinic visit. Patient and provider were located at their individual homes. --Estephania Rainey MD on 3/31/2021 at 1:38 PM    An electronic signature was used to authenticate this note.      Estephania Rainey MD  3/31/2021  1:38 PM

## 2021-04-01 NOTE — PROGRESS NOTES
Results for the most recent sleep study on Smiley Arroyo  1990 are finalized and available. Please see media tab.     Electronically signed by Hudson Toledo on 3/31/2021 at 9:40 PM

## 2021-08-09 DIAGNOSIS — E03.9 ACQUIRED HYPOTHYROIDISM: ICD-10-CM

## 2021-08-09 RX ORDER — LEVOTHYROXINE SODIUM 0.05 MG/1
TABLET ORAL
Qty: 90 TABLET | Refills: 1 | Status: CANCELLED | OUTPATIENT
Start: 2021-08-09 | End: 2021-11-09

## 2021-09-13 DIAGNOSIS — E03.9 ACQUIRED HYPOTHYROIDISM: ICD-10-CM

## 2021-09-13 RX ORDER — LEVOTHYROXINE SODIUM 0.05 MG/1
TABLET ORAL
Qty: 90 TABLET | Refills: 1 | Status: SHIPPED | OUTPATIENT
Start: 2021-09-13 | End: 2022-01-05 | Stop reason: SDUPTHER

## 2021-12-03 RX ORDER — OMEPRAZOLE 40 MG/1
40 CAPSULE, DELAYED RELEASE ORAL
Qty: 30 CAPSULE | Refills: 0 | Status: SHIPPED | OUTPATIENT
Start: 2021-12-03 | End: 2022-01-03

## 2022-01-03 RX ORDER — OMEPRAZOLE 40 MG/1
CAPSULE, DELAYED RELEASE ORAL
Qty: 30 CAPSULE | Refills: 0 | Status: SHIPPED | OUTPATIENT
Start: 2022-01-03 | End: 2022-01-05 | Stop reason: SDUPTHER

## 2022-01-05 ENCOUNTER — OFFICE VISIT (OUTPATIENT)
Dept: FAMILY MEDICINE CLINIC | Age: 32
End: 2022-01-05
Payer: COMMERCIAL

## 2022-01-05 VITALS
BODY MASS INDEX: 32.13 KG/M2 | RESPIRATION RATE: 16 BRPM | HEART RATE: 81 BPM | HEIGHT: 68 IN | DIASTOLIC BLOOD PRESSURE: 74 MMHG | WEIGHT: 212 LBS | SYSTOLIC BLOOD PRESSURE: 114 MMHG | OXYGEN SATURATION: 98 %

## 2022-01-05 DIAGNOSIS — E28.2 PCOS (POLYCYSTIC OVARIAN SYNDROME): ICD-10-CM

## 2022-01-05 DIAGNOSIS — K21.9 GASTROESOPHAGEAL REFLUX DISEASE, UNSPECIFIED WHETHER ESOPHAGITIS PRESENT: ICD-10-CM

## 2022-01-05 DIAGNOSIS — R53.83 FATIGUE, UNSPECIFIED TYPE: ICD-10-CM

## 2022-01-05 DIAGNOSIS — F41.9 ANXIETY: ICD-10-CM

## 2022-01-05 DIAGNOSIS — L65.9 HAIR THINNING: ICD-10-CM

## 2022-01-05 DIAGNOSIS — E03.9 ACQUIRED HYPOTHYROIDISM: Primary | ICD-10-CM

## 2022-01-05 DIAGNOSIS — Z13.220 LIPID SCREENING: ICD-10-CM

## 2022-01-05 PROCEDURE — 99214 OFFICE O/P EST MOD 30 MIN: CPT | Performed by: STUDENT IN AN ORGANIZED HEALTH CARE EDUCATION/TRAINING PROGRAM

## 2022-01-05 RX ORDER — OMEPRAZOLE 40 MG/1
CAPSULE, DELAYED RELEASE ORAL
Qty: 90 CAPSULE | Refills: 1 | Status: SHIPPED | OUTPATIENT
Start: 2022-01-05 | End: 2022-08-12 | Stop reason: SDUPTHER

## 2022-01-05 RX ORDER — LEVOTHYROXINE SODIUM 0.05 MG/1
TABLET ORAL
Qty: 90 TABLET | Refills: 1 | Status: SHIPPED | OUTPATIENT
Start: 2022-01-05 | End: 2022-09-13

## 2022-01-05 ASSESSMENT — ENCOUNTER SYMPTOMS
SHORTNESS OF BREATH: 0
NAUSEA: 0
ABDOMINAL PAIN: 0
WHEEZING: 0
SORE THROAT: 0

## 2022-01-05 NOTE — PROGRESS NOTES
2022    Leo Crawford    Chief Complaint   Patient presents with    Fatigue     Feels like she also has been losing hair . HPI  History was obtained from patient. Donavan Hanley is a 32 y.o. female with a PMHx as listed below who presents today for acute complaint of fatigue. Lots of fatigue, hair falling falling in clumps in shower thoughout. Mild 'brain fog'   No changes in hairline, no rash rash. Following with Dr. Lukasz lorenz stable    Menstrual periods heavy, irregular  Following with with Dr. Lien Salgado recent Dx. PCOS, was on metformin unable to tolerate. 1. Acquired hypothyroidism    2. Anxiety    3. Gastroesophageal reflux disease, unspecified whether esophagitis present    4. PCOS (polycystic ovarian syndrome)    5. Fatigue, unspecified type    6. Hair thinning    7. Lipid screening             REVIEW OF SYMPTOMS    Review of Systems   Constitutional: Negative for chills and fatigue. HENT: Negative for congestion and sore throat. Respiratory: Negative for shortness of breath and wheezing. Cardiovascular: Negative for chest pain and palpitations. Gastrointestinal: Negative for abdominal pain and nausea. Genitourinary: Negative for frequency and urgency. Neurological: Negative for light-headedness. PAST MEDICAL HISTORY  History reviewed. No pertinent past medical history.     FAMILY HISTORY  Family History   Problem Relation Age of Onset   [de-identified] / [place] Mother     High Blood Pressure Father     High Cholesterol Father        SOCIAL HISTORY  Social History     Socioeconomic History    Marital status:      Spouse name: None    Number of children: None    Years of education: None    Highest education level: None   Occupational History    None   Tobacco Use    Smoking status: Former Smoker     Packs/day: 0.25     Years: 9.00     Pack years: 2.25     Start date:      Quit date: 2019     Years since quittin.7    Smokeless tobacco: Never Used   Vaping Use    Vaping Use: Never used   Substance and Sexual Activity    Alcohol use: Yes     Alcohol/week: 1.0 - 2.0 standard drink     Types: 1 - 2 Cans of beer per week    Drug use: No    Sexual activity: Yes     Partners: Male   Other Topics Concern    None   Social History Narrative    None     Social Determinants of Health     Financial Resource Strain: Low Risk     Difficulty of Paying Living Expenses: Not hard at all   Food Insecurity: No Food Insecurity    Worried About Running Out of Food in the Last Year: Never true    [place] of Food in the Last Year: Never true   Transportation Needs: No Transportation Needs    Lack of Transportation (Medical): No    Lack of Transportation (Non-Medical): No   Physical Activity:     Days of Exercise per Week: Not on file    Minutes of Exercise per Session: Not on file   Stress:     Feeling of Stress : Not on file   Social Connections:     Frequency of Communication with Friends and Family: Not on file    Frequency of Social Gatherings with Friends and Family: Not on file    Attends Yarsanism Services: Not on file    Active Member of Clubs or Organizations: Not on file    Attends Club or Organization Meetings: Not on file    Marital Status: Not on file   Intimate Partner Violence:     Fear of Current or Ex-Partner: Not on file    Emotionally Abused: Not on file    Physically Abused: Not on file    Sexually Abused: Not on file   Housing Stability:     Unable to Pay for Housing in the Last Year: Not on file    Number of [place] in the Last Year: Not on file    Unstable Housing in the Last Year: Not on file        SURGICAL HISTORY  History reviewed. No pertinent surgical history.               CURRENT MEDICATIONS  Current Outpatient Medications   Medication Sig Dispense Refill    omeprazole (PRILOSEC) 40 MG delayed release capsule TAKE ONE CAPSULE BY MOUTH EVERY MORNING BEFORE BREAKFAST 90 capsule 1    levothyroxine (SYNTHROID) 50 MCG tablet TAKE ONE TABLET BY MOUTH DAILY 90 tablet 1    clonazePAM (KLONOPIN) 0.5 MG tablet Take 0.5 mg by mouth daily as needed. Dr Beny Espinosa      FLUoxetine (PROZAC) 20 MG capsule Take 20 mg by mouth daily      ibuprofen (ADVIL;MOTRIN) 600 MG tablet Take 1 tablet by mouth every 6 hours as needed for Pain 30 tablet 1    folic acid (V-R FOLIC ACID) 143 MCG tablet Take 1 tablet by mouth daily 90 tablet 1     No current facility-administered medications for this visit. ALLERGIES  Allergies   Allergen Reactions    Codeine Palpitations       PHYSICAL EXAM    /74   Pulse 81   Resp 16   Ht 5' 8\" (1.727 m)   Wt 212 lb (96.2 kg)   SpO2 98%   BMI 32.23 kg/m²     Physical Exam  Constitutional:       Appearance: Normal appearance. HENT:      Head: Normocephalic and atraumatic. Eyes:      Extraocular Movements: Extraocular movements intact. Pupils: Pupils are equal, round, and reactive to light. Cardiovascular:      Rate and Rhythm: Normal rate and regular rhythm. Pulses: Normal pulses. Heart sounds: No murmur heard. No friction rub. No gallop. Skin:     General: Skin is warm and dry. Comments: Thinning hair throughout   Neurological:      General: No focal deficit present. Mental Status: She is alert. Psychiatric:         Mood and Affect: Mood normal.         Behavior: Behavior normal.         ASSESSMENT & PLAN    1. Acquired hypothyroidism  TSH fair control on current regimen, labs reviewed started on folic acid daily  - levothyroxine (SYNTHROID) 50 MCG tablet; TAKE ONE TABLET BY MOUTH DAILY  Dispense: 90 tablet; Refill: 1    2. Anxiety  -Stable    3. Gastroesophageal reflux disease, unspecified whether esophagitis present  GERD stable on current regimen  - omeprazole (PRILOSEC) 40 MG delayed release capsule; TAKE ONE CAPSULE BY MOUTH EVERY MORNING BEFORE BREAKFAST  Dispense: 90 capsule; Refill: 1    4.  PCOS (polycystic ovarian syndrome)  -Mild elevation in testosterone to be seen in patients with PCOS I offered patient a referral to endocrinology she declined the now  - Testosterone, free, total; Future    5. Fatigue, unspecified type  -No lab abnormalities to point reason for fatigue. We will start folic acid. - IRON AND TIBC; Future  - FERRITIN; Future  - MAGNESIUM; Future  - VITAMIN B12 & FOLATE; Future    6. Hair thinning  Offered womens rogaine, she declines  -If not improving consider dermatology evaluation  - Comprehensive Metabolic Panel; Future  - TSH WITH REFLEX TO FT4; Future  - Lipid, Fasting; Future    7. Lipid screening  - CBC WITH AUTO DIFFERENTIAL; Future      Return in about 6 months (around 7/5/2022).          Electronically signed by Imani James DO on 1/23/2022

## 2022-01-07 DIAGNOSIS — R53.83 FATIGUE, UNSPECIFIED TYPE: ICD-10-CM

## 2022-01-07 DIAGNOSIS — L65.9 HAIR THINNING: ICD-10-CM

## 2022-01-07 DIAGNOSIS — Z13.220 LIPID SCREENING: ICD-10-CM

## 2022-01-07 DIAGNOSIS — E28.2 PCOS (POLYCYSTIC OVARIAN SYNDROME): ICD-10-CM

## 2022-01-07 LAB
A/G RATIO: 1.6 (ref 1.1–2.2)
ALBUMIN SERPL-MCNC: 4.2 G/DL (ref 3.4–5)
ALP BLD-CCNC: 80 U/L (ref 40–129)
ALT SERPL-CCNC: 20 U/L (ref 10–40)
ANION GAP SERPL CALCULATED.3IONS-SCNC: 10 MMOL/L (ref 3–16)
AST SERPL-CCNC: 14 U/L (ref 15–37)
BASOPHILS ABSOLUTE: 0 K/UL (ref 0–0.2)
BASOPHILS RELATIVE PERCENT: 0.4 %
BILIRUB SERPL-MCNC: 0.4 MG/DL (ref 0–1)
BUN BLDV-MCNC: 11 MG/DL (ref 7–20)
CALCIUM SERPL-MCNC: 9.4 MG/DL (ref 8.3–10.6)
CHLORIDE BLD-SCNC: 100 MMOL/L (ref 99–110)
CHOLESTEROL, FASTING: 208 MG/DL (ref 0–199)
CO2: 26 MMOL/L (ref 21–32)
CREAT SERPL-MCNC: 0.8 MG/DL (ref 0.6–1.1)
EOSINOPHILS ABSOLUTE: 0.2 K/UL (ref 0–0.6)
EOSINOPHILS RELATIVE PERCENT: 2.5 %
FERRITIN: 28.9 NG/ML (ref 15–150)
FOLATE: 3.5 NG/ML (ref 4.78–24.2)
GFR AFRICAN AMERICAN: >60
GFR NON-AFRICAN AMERICAN: >60
GLUCOSE BLD-MCNC: 96 MG/DL (ref 70–99)
HCT VFR BLD CALC: 42 % (ref 36–48)
HDLC SERPL-MCNC: 43 MG/DL (ref 40–60)
HEMOGLOBIN: 14 G/DL (ref 12–16)
IRON % SATURATION: 22 % (ref 15–50)
IRON: 74 UG/DL (ref 37–145)
LDL CHOLESTEROL CALCULATED: 137 MG/DL
LYMPHOCYTES ABSOLUTE: 2.3 K/UL (ref 1–5.1)
LYMPHOCYTES RELATIVE PERCENT: 28.6 %
MAGNESIUM: 2 MG/DL (ref 1.8–2.4)
MCH RBC QN AUTO: 29.9 PG (ref 26–34)
MCHC RBC AUTO-ENTMCNC: 33.4 G/DL (ref 31–36)
MCV RBC AUTO: 89.5 FL (ref 80–100)
MONOCYTES ABSOLUTE: 0.4 K/UL (ref 0–1.3)
MONOCYTES RELATIVE PERCENT: 5.3 %
NEUTROPHILS ABSOLUTE: 5.1 K/UL (ref 1.7–7.7)
NEUTROPHILS RELATIVE PERCENT: 63.2 %
PDW BLD-RTO: 13.6 % (ref 12.4–15.4)
PLATELET # BLD: 354 K/UL (ref 135–450)
PMV BLD AUTO: 7.8 FL (ref 5–10.5)
POTASSIUM SERPL-SCNC: 4.4 MMOL/L (ref 3.5–5.1)
RBC # BLD: 4.7 M/UL (ref 4–5.2)
SODIUM BLD-SCNC: 136 MMOL/L (ref 136–145)
TOTAL IRON BINDING CAPACITY: 334 UG/DL (ref 260–445)
TOTAL PROTEIN: 6.8 G/DL (ref 6.4–8.2)
TRIGLYCERIDE, FASTING: 140 MG/DL (ref 0–150)
TSH REFLEX FT4: 1.15 UIU/ML (ref 0.27–4.2)
VITAMIN B-12: 462 PG/ML (ref 211–911)
VLDLC SERPL CALC-MCNC: 28 MG/DL
WBC # BLD: 8 K/UL (ref 4–11)

## 2022-01-11 LAB
SEX HORMONE BINDING GLOBULIN: 14 NMOL/L (ref 30–135)
TESTOSTERONE FREE-NONMALE: 10.8 PG/ML (ref 1.3–9.2)
TESTOSTERONE TOTAL: 40 NG/DL (ref 20–70)

## 2022-01-12 ENCOUNTER — PATIENT MESSAGE (OUTPATIENT)
Dept: FAMILY MEDICINE CLINIC | Age: 32
End: 2022-01-12

## 2022-01-12 DIAGNOSIS — E53.8 FOLIC ACID DEFICIENCY: Primary | ICD-10-CM

## 2022-01-13 NOTE — TELEPHONE ENCOUNTER
Chidi Felix, yes people with PCOS usually have some elevation in testosterone. Your free testosterone level was only minimally elevated. Personally I do not feel the testosterone level you are at is the reason for your hair loss 'brain fog'. If you prefer a consultation with an Endocrinologist I would be happy to refer you to one of your preference. In [facility] there is Dr. Nathan Grover. Overall your labs look good. You have some deficiency in folic acid I recommend you take a multivitamin with [id number] Folic acid daily if if you prefer we can send in a Rx. Of folic acid.      Sent on [employer]

## 2022-01-14 RX ORDER — LANOLIN ALCOHOL/MO/W.PET/CERES
400 CREAM (GRAM) TOPICAL DAILY
Qty: 90 TABLET | Refills: 1 | Status: SHIPPED | OUTPATIENT
Start: 2022-01-14 | End: 2022-09-16 | Stop reason: ALTCHOICE

## 2022-02-21 ENCOUNTER — PATIENT MESSAGE (OUTPATIENT)
Dept: FAMILY MEDICINE CLINIC | Age: 32
End: 2022-02-21

## 2022-02-21 DIAGNOSIS — R53.83 FATIGUE, UNSPECIFIED TYPE: ICD-10-CM

## 2022-02-21 DIAGNOSIS — E28.2 PCOS (POLYCYSTIC OVARIAN SYNDROME): Primary | ICD-10-CM

## 2022-03-02 DIAGNOSIS — R53.83 FATIGUE, UNSPECIFIED TYPE: ICD-10-CM

## 2022-03-02 DIAGNOSIS — E28.2 PCOS (POLYCYSTIC OVARIAN SYNDROME): ICD-10-CM

## 2022-03-02 LAB
FOLLICLE STIMULATING HORMONE: 5.8 MIU/ML
LUTEINIZING HORMONE: 5.8 MIU/ML
PROLACTIN: 14.5 NG/ML

## 2022-04-21 ENCOUNTER — HOSPITAL ENCOUNTER (OUTPATIENT)
Age: 32
Setting detail: SPECIMEN
Discharge: HOME | End: 2022-04-21

## 2022-04-22 LAB — HBV SURFACE AB TITR SER: 494.6 MIU/ML

## 2022-04-29 ENCOUNTER — PATIENT MESSAGE (OUTPATIENT)
Dept: FAMILY MEDICINE CLINIC | Age: 32
End: 2022-04-29

## 2022-04-29 DIAGNOSIS — E66.9 OBESITY (BMI 30-39.9): Primary | ICD-10-CM

## 2022-05-09 RX ORDER — SEMAGLUTIDE 0.25 MG/.5ML
INJECTION, SOLUTION SUBCUTANEOUS
Qty: 6 ML | Refills: 0 | Status: SHIPPED | OUTPATIENT
Start: 2022-05-09 | End: 2022-09-16 | Stop reason: ALTCHOICE

## 2022-05-12 ENCOUNTER — TELEPHONE (OUTPATIENT)
Dept: FAMILY MEDICINE CLINIC | Age: 32
End: 2022-05-12

## 2022-05-12 NOTE — TELEPHONE ENCOUNTER
Attempted prior auth on Wegovy. Response: This request cannot be processed due to the medication is not covered by the plan.

## 2022-05-13 DIAGNOSIS — U07.1 COVID-19: Primary | ICD-10-CM

## 2022-05-13 DIAGNOSIS — J02.9 SORE THROAT: ICD-10-CM

## 2022-05-13 DIAGNOSIS — R11.0 NAUSEA: ICD-10-CM

## 2022-05-13 RX ORDER — BENZONATATE 100 MG/1
100 CAPSULE ORAL 3 TIMES DAILY PRN
Qty: 30 CAPSULE | Refills: 0 | Status: SHIPPED | OUTPATIENT
Start: 2022-05-13 | End: 2022-05-20

## 2022-05-13 RX ORDER — ONDANSETRON 4 MG/1
4 TABLET, FILM COATED ORAL EVERY 8 HOURS PRN
Qty: 30 TABLET | Refills: 0 | Status: SHIPPED | OUTPATIENT
Start: 2022-05-13

## 2022-05-16 NOTE — TELEPHONE ENCOUNTER
Please discuss with patient unable to obtain wegovy. We can refer to dietitian or can do a trial of 3 month adipex.  If patient has binge eating disorder we could consider a different medication topamax to reduce cravings

## 2022-05-16 NOTE — TELEPHONE ENCOUNTER
Called and notified Gabe Robledo not covered. She is just getting over Covid. She will call us back if she decides to pursue weight loss options.

## 2022-05-17 ENCOUNTER — OFFICE VISIT (OUTPATIENT)
Dept: FAMILY MEDICINE CLINIC | Age: 32
End: 2022-05-17
Payer: COMMERCIAL

## 2022-05-17 ENCOUNTER — HOSPITAL ENCOUNTER (OUTPATIENT)
Age: 32
Discharge: HOME | End: 2022-05-17
Payer: COMMERCIAL

## 2022-05-17 ENCOUNTER — HOSPITAL ENCOUNTER (OUTPATIENT)
Dept: GENERAL RADIOLOGY | Age: 32
Discharge: HOME | End: 2022-05-17
Payer: COMMERCIAL

## 2022-05-17 VITALS
RESPIRATION RATE: 12 BRPM | HEIGHT: 68 IN | HEART RATE: 84 BPM | OXYGEN SATURATION: 98 % | BODY MASS INDEX: 34.1 KG/M2 | TEMPERATURE: 97.2 F | WEIGHT: 225 LBS

## 2022-05-17 DIAGNOSIS — U07.1 COVID-19: Primary | ICD-10-CM

## 2022-05-17 DIAGNOSIS — M54.9 ACUTE UPPER BACK PAIN: ICD-10-CM

## 2022-05-17 DIAGNOSIS — F41.9 ANXIETY: ICD-10-CM

## 2022-05-17 DIAGNOSIS — R07.89 FEELING OF CHEST TIGHTNESS: ICD-10-CM

## 2022-05-17 DIAGNOSIS — F17.210 CIGARETTE SMOKER: ICD-10-CM

## 2022-05-17 DIAGNOSIS — U07.1 COVID-19: ICD-10-CM

## 2022-05-17 PROCEDURE — 99213 OFFICE O/P EST LOW 20 MIN: CPT | Performed by: PHYSICIAN ASSISTANT

## 2022-05-17 PROCEDURE — 71046 X-RAY EXAM CHEST 2 VIEWS: CPT

## 2022-05-17 NOTE — PROGRESS NOTES
5/17/22  Azul Wilson  1990    FLU/COVID-19 CLINIC EVALUATION    HPI SYMPTOMS:  [facility] [employer] [facility] [employer]    Employer:    [x] Fevers  [x] Chills  [] Cough  [] Coughing up blood  [x] Chest Congestion  [x] Nasal Congestion  [x] Feeling short of breath  [] Sometimes  [] Frequently  [] All the time  [x] Chest pain  [x] Headaches  [x]Tolerable  [] Severe  [x] Sore throat  [x] Muscle aches  [x] Nausea  [x] Vomiting  []Unable to keep fluids down  [x] Diarrhea  []Severe    [x] OTHER SYMPTOMS:  Fatigue  Symptom Duration:   [] 1  [] 2   [] 3   [] 4    [] 5   [x] 6   [] 7   [] 8   [] 9   [] 10   [] 11   [] 12   [] 13   [] 14   [] Longer than 14 days    Symptom course:   [] Worsening     [] Stable     [x] Improving    RISK FACTORS:    [] Pregnant or possibly pregnant  [] Age over 61  [] Diabetes  [] Heart disease  [] Asthma  [] COPD/Other chronic lung diseases  [] Active Cancer  [] On Chemotherapy  [] Taking oral steroids  [] History Lymphoma/Leukemia  [] Close contact with a lab confirmed COVID-19 patient within 14 days of symptom onset  [] History of travel from affected geographical areas within 14 days of symptom onset       VITALS:  There were no vitals filed for this visit. TESTS:    POCT FLU:  [] Positive     []Negative    ASSESSMENT:    [] Flu  [] Possible COVID-19  [] Strep    PLAN:    [] Discharge home with written instructions for:  [] Flu management  [] Possible COVID-19 infection with self-quarantine and management of symptoms  [] Follow-up with primary care physician or emergency department if worsens  [] Evaluation per physician/NP/PA in clinic  [] Sent to ER       An  electronic signature was used to authenticate this note.      --Catherine Marina LPN on 6/08/8212 at 4:71 PM

## 2022-05-17 NOTE — PATIENT INSTRUCTIONS
Continue routine medications   Deep breaths periodically throughout the day  Continue to monitor heart rate and SPO2  For any worsening of chest tightness or back pain-go to the ER. For any additional symptoms such as increased heart rate, low oxygen saturation, leg pain, leg swelling, skin color changes, shortness of breath-go to the ER      Patient Education        Learning About Coronavirus (COVID-19)  What is coronavirus (COVID-19)? COVID-19 is a disease caused by a type of coronavirus. This illness was firstfound in December 2019. It has since spread worldwide. Coronaviruses are a large group of viruses. They cause the common cold. They also cause more serious illnesses like [place] East respiratory syndrome (MERS) and severe acute respiratory syndrome (SARS). COVID-19 is caused by a novelcoronavirus. That means it's a new type that has not been seen in people before. What are the symptoms? COVID-19 symptoms may include:   Fever.  Cough.  Trouble breathing.  Chills or repeated shaking with chills.  Muscle and body aches.  Headache.  Sore throat.  New loss of taste or smell.  Vomiting.  Diarrhea. In severe cases, COVID-19 can cause pneumonia and make it hard to breathewithout help from a machine. It can cause death. How is it diagnosed? COVID-19 is diagnosed with a viral test. This may also be called a PCR test or antigen test. It looks for evidence of the virus in your breathing passages orlungs (respiratory system). The test is most often done on a sample from the nose, throat, or lungs. It's sometimes done on a sample of saliva. One way a sample is collected is byputting a long swab into the back of your nose. If you have questions about COVID-19 testing, ask your doctor or go to cdc.govto use the COVID-19 Viral Testing Tool. How is it treated? Mild cases of COVID-19 can be treated at home.  Serious cases need treatment in the hospital. Treatment may include medicines to reduce symptoms, plus breathing support such as oxygen therapy or a ventilator. Some people may beplaced on their belly to help their oxygen levels. Treatments that may help people who have COVID-19 include:  Antiviral medicines. These medicines treat viral infections. Immune-based therapy. These medicines help the immune system fight COVID-19. Examples include monoclonal antibodies. Blood thinners. These medicines help prevent blood clots. People with severe illness are at risk for blood clots. How can you protect yourself and others?  Get vaccinated and boosted.  Avoid sick people and stay away from others if you are sick.  Stay at least 6 feet away from other people.  Avoid crowds, especially inside.  Get tested for COVID-19 before you have an indoor visit with people that don't live with you.  Cover your mouth with a tissue when you cough or sneeze.  Wash your hands often, especially after you cough or sneeze. Use soap and water, and scrub for at least 20 seconds. If soap and water aren't available, use an alcohol-based hand .  Avoid touching your mouth, nose, and eyes. Be sure to follow all instructions from the [facility] and your local health authorities. Here are some examples of specific precautions you may need totake.  If you are not fully vaccinated and boosted:  ? Wear a mask if you have to go to public areas.  Even if you're fully vaccinated and boosted, there's still a chance you can get and spread COVID-19. If you live in an area where COVID-19 is spreading quickly, wear a mask if you have to go to indoor public areas. You might also want to wear a mask in crowded outdoor areas as well as public indoor spaces if you:  ? Have certain health conditions. ? Live with someone who has a compromised immune system. ? Live with someone who is not fully vaccinated.    If you have been exposed to the virus and are not fully vaccinated and boosted:  ? Talk to your doctor as soon as you can. Your doctor might have you take medicine to help prevent serious illness. ? Get a COVID-19 test. You may need to be tested more than once. ? Stay home. Try to separate from other people where you live. Don't go to school, work, or public areas. ? Wear a mask around other people for a full 10 days. Avoid travel and stay away from people at high risk for serious illness. ? Watch for symptoms.  If you have been exposed and you are fully vaccinated and boosted:  ? Talk to your doctor as soon as you can. Your doctor may have you take medicine to help prevent serious illness. ? Get a COVID-19 test. You may need to be tested more than once. ? Wear a mask around other people for a full 10 days. Avoid travel and stay away from people at high risk for serious illness. ? Watch for symptoms. If you're sick or test positive for COVID-19:   Talk to your doctor as soon as you can. Your doctor may have you take medicine to help prevent serious illness.  Get a COVID-19 test unless you have already been tested. You may need to be tested more than once.  Stay home. Leave only if you need to get medical care.  Wear a mask whenever you're around other people for a full 10 days.  Avoid travel and stay away from people at high risk for serious illness.  Limit contact with pets and people in your home. If possible, stay in a separate bedroom and use a separate bathroom.  Clean and disinfect your home every day. Use household  and disinfectant wipes or sprays. Take special care to clean things that you touch with your hands. If you have questions about COVID-19 testing, ask your doctor or go to cdc.govto use the COVID-19 Viral Testing Tool. How can you self-isolate when you have COVID-19? If you have COVID-19, there are things you can do to help avoid spreading thevirus to others.  Limit contact with people in your home. If possible, stay in a separate bedroom and use a separate bathroom.    Wear a mask when you are around other people.  If you have to leave home, avoid crowds and try to stay at least 6 feet away from other people.  Avoid contact with pets and other animals.  Cover your mouth and nose with a tissue when you cough or sneeze. Then throw it in the trash right away.  Wash your hands often, especially after you cough or sneeze. Use soap and water, and scrub for at least 20 seconds. If soap and water aren't available, use an alcohol-based hand .  Don't share personal household items. These include bedding, towels, cups and glasses, and eating utensils. [address] in the warmest water allowed for the fabric type, and dry it completely. It's okay to wash other people's laundry with yours.  Clean and disinfect your home. Use household  and disinfectant wipes or sprays. When should you call for help? Call 911 anytime you think you may need emergency care. For example, call if you have life-threatening symptoms, such as:     You have severe trouble breathing. (You can't talk at all.)      You have constant chest pain or pressure.      You are severely dizzy or lightheaded.      You are confused or can't think clearly.      You have pale, gray, or blue-colored skin or lips.      You pass out (lose consciousness) or are very hard to wake up.      You have loss of balance or trouble walking.      You have trouble seeing out of one or both eyes.      You have weakness or drooping on one side of the face.      You have weakness or numbness in an arm or a leg.      You have trouble speaking.      You have a severe headache.      You have a seizure. Call your doctor now or seek immediate medical care if:     You have moderate trouble breathing. (You can't speak a full sentence.)      You are coughing up blood.      You have signs of low blood pressure. These include feeling lightheaded; being too weak to stand; and having cold, pale, clammy skin.    Watch

## 2022-05-17 NOTE — PROGRESS NOTES
5/17/2022    Jason Zheng    Chief Complaint   Patient presents with    Positive For Covid-19       HPI  History was obtained from patient. Chica Ortega is a 28 y.o. female who presents today for evaluation. She tested positive for COVID-19 approximately 1 week ago. She states that her symptoms have been very mild, most symptoms now resolved. She still has nasal congestion, loss of taste and smell. Over the last 24 hours or so, she has also been experiencing chest tightness and upper back pain which is what brought her into the office. She has not had a cough. Back pain seems exacerbated by certain movements. No known injury. 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 She questions if the chest tightness could be related to her anxiety and panic attacks. She has been in discussion with her PCP about her symptoms. PCP prescribed throat lozenges and Zofran. She follows psych Dr. Indira Jordan- uses Clonidine prn anxiety which has helped current chest tightness. Patient denies chest heaviness, palpitations, paraesthesias, nausea, vomiting, diaphoresis, cough, or hemoptysis. Patient denies lower extremity swelling or pain, skin color changes, recent long travel, history of PE/DVT, exogenous hormone use, recent surgeries/hospitalizations, or recent trauma. She is a cigarette smoker. 1/2 ppd x 9 years. Patient states that her mother has had DVTs that are unprovoked. She follows hematologist Dr. Marissa Baires.  Patient states she has had a normal work-up by Dr. Adonis Maddox  History reviewed. No pertinent past medical history.     FAMILY HISTORY  Family History   Problem Relation Age of Onset   [de-identified] / [place] Mother     High Blood Pressure Father     High Cholesterol Father        SOCIAL HISTORY  Social History     Socioeconomic History    Marital status:      Spouse name: None    Number of children: None    Years of education: None    Highest education level: None   Occupational History    None   Tobacco Use    Smoking status: Former Smoker     Packs/day: 0.25     Years: 9.00     Pack years: 2.25     Start date: 2010     Quit date: 4/25/2019     Years since quitting: 3.0    Smokeless tobacco: Never Used   Vaping Use    Vaping Use: Never used   Substance and Sexual Activity    Alcohol use: Yes     Alcohol/week: 1.0 - 2.0 standard drink     Types: 1 - 2 Cans of beer per week    Drug use: No    Sexual activity: Yes     Partners: Male   Other Topics Concern    None   Social History Narrative    None     Social Determinants of Health     Financial Resource Strain:     Difficulty of Paying Living Expenses: Not on file   Food Insecurity:     Worried About Running Out of Food in the Last Year: Not on file    [place]lin of Food in the Last Year: Not on file   Transportation Needs:     Lack of Transportation (Medical): Not on file    Lack of Transportation (Non-Medical): Not on file   Physical Activity:     Days of Exercise per Week: Not on file    Minutes of Exercise per Session: Not on file   Stress:     Feeling of Stress : Not on file   Social Connections:     Frequency of Communication with Friends and Family: Not on file    Frequency of Social Gatherings with Friends and Family: Not on file    Attends Zoroastrianism Services: Not on file    Active Member of [address] [place] [address] or Organizations: Not on file    Attends Club or Organization Meetings: Not on file    Marital Status: Not on file   Intimate Partner Violence:     Fear of Current or Ex-Partner: Not on file    Emotionally Abused: Not on file    Physically Abused: Not on file    Sexually Abused: Not on file   Housing Stability:     Unable to Pay for Housing in the Last Year: Not on file    Number of [place] in the Last Year: Not on file    Unstable Housing in the Last Year: Not on file        SURGICAL HISTORY  History reviewed.  No pertinent surgical history. CURRENT MEDICATIONS  Current Outpatient Medications   Medication Sig Dispense Refill    benzonatate (TESSALON) 100 MG capsule Take 1 capsule by mouth 3 times daily as needed for Cough 30 capsule 0    ondansetron (ZOFRAN) 4 MG tablet Take 1 tablet by mouth every 8 hours as needed for Nausea or Vomiting 30 tablet 0    Semaglutide-Weight Management (WEGOVY) 0.25 MG/0.5ML SOAJ SC injection Inject 0.5ml  SUBCUTANEOUSLY EVERY 7 DAYS for 4 WEEKS than increase to 1ml EVERY 7 DAYS FOR 4 WEEKS 6 mL 0    folic acid (V-R FOLIC ACID) 251 MCG tablet Take 1 tablet by mouth daily 90 tablet 1    omeprazole (PRILOSEC) 40 MG delayed release capsule TAKE ONE CAPSULE BY MOUTH EVERY MORNING BEFORE BREAKFAST 90 capsule 1    levothyroxine (SYNTHROID) 50 MCG tablet TAKE ONE TABLET BY MOUTH DAILY 90 tablet 1    clonazePAM (KLONOPIN) 0.5 MG tablet Take 0.5 mg by mouth daily as needed. Dr Matthew Carey      FLUoxetine (PROZAC) 20 MG capsule Take 20 mg by mouth daily      ibuprofen (ADVIL;MOTRIN) 600 MG tablet Take 1 tablet by mouth every 6 hours as needed for Pain 30 tablet 1     No current facility-administered medications for this visit. ALLERGIES  Allergies   Allergen Reactions    Codeine Palpitations       PHYSICAL EXAM    Pulse 84   Temp 97.2 °F (36.2 °C)   Resp 12   Ht 5' 8\" (1.727 m)   Wt 225 lb (102.1 kg)   SpO2 98%   BMI 34.21 kg/m²     Constitutional:  Well developed, well nourished  HENT:  Normocephalic, atraumatic, bilateral external ears normal, bilateral ear canals with soft cerumen impaction, TMs are slightly visible without fluid or infection. Oropharynx moist, postnasal drip noted. No petechiae or exudate. Mild nasal congestion.   Eyes:   conjunctiva normal, no discharge, no scleral icterus  Neck:  No tenderness, supple  Lymphatic:  No lymphadenopathy noted  Cardiovascular:  Normal heart rate, normal rhythm, no murmurs, gallops or rubs  Thorax & Lungs:  Normal breath sounds, no respiratory distress, no wheezing, no rales, no rhonchi  Skin:  Warm, dry, no erythema, no rash  Back: No reproducible back pain. Extremities:  No edema, no tenderness, no cyanosis, no clubbing, no palpable cord  Neurologic:  Alert & oriented   Psychiatric:  Affect normal, mood normal    ASSESSMENT & PLAN    Mikey Le was seen today for positive for covid-19. Diagnoses and all orders for this visit:    COVID-19  -     XR CHEST (2 VW); Future    Feeling of chest tightness  -     XR CHEST (2 VW); Future    Acute upper back pain  -     XR CHEST (2 VW); Future    Anxiety    Cigarette smoker       Continue routine medications   Deep breaths periodically throughout the day  Continue to monitor heart rate and SPO2  For any worsening of chest tightness or back pain-go to the ER. For any additional symptoms such as increased heart rate, low oxygen saturation, leg pain, leg swelling, skin color changes, shortness of breath-go to the ER  Smoking cessation encouraged    There are no discontinued medications. No follow-ups on file. Patient verbalizes understanding with the above plan and is in agreement. Patient will call with  questions or concerns. I did don appropriate PPE (including N95 face mask, protective safety glasses, face shield, gloves, and gown) as recommended by the health facility/national standard best practice, during my interaction with the patient. Please note that this chart was generated using dragon dictation software. Although every effort was made to ensure the accuracy of this automated transcription, some errors in transcription may have occurred.     Electronically signed by Justice Howell PA-C on 5/17/2022

## 2022-05-18 DIAGNOSIS — R53.83 FATIGUE, UNSPECIFIED TYPE: ICD-10-CM

## 2022-05-18 DIAGNOSIS — E28.2 PCOS (POLYCYSTIC OVARIAN SYNDROME): ICD-10-CM

## 2022-05-23 ENCOUNTER — HOSPITAL ENCOUNTER (OUTPATIENT)
Age: 32
Setting detail: SPECIMEN
Discharge: HOME | End: 2022-05-23

## 2022-05-23 LAB
CORTISOL (UR), FREE: 14.9 UG/D
CORTISOL URINE, FREE (/G CRT): 8.76 UG/G CRT
CORTISOL,F,UG/L,U: 5.17 UG/L
CREATININE 24 HOUR URINE: 1699 MG/D (ref 700–1600)
CREATININE URINE: 59 MG/DL
D-DIMER QUANTITATIVE: 0.37 MG/L FEU
HOURS COLLECTED: 20
INTERPRETATION: ABNORMAL
URINE TOTAL VOLUME: 2400

## 2022-05-24 LAB
C-REACTIVE PROTEIN: <3 MG/L (ref 0–5)
TROPONIN, HIGH SENSITIVITY: <6 NG/L (ref 0–14)

## 2022-05-27 ENCOUNTER — HOSPITAL ENCOUNTER (OUTPATIENT)
Age: 32
Discharge: HOME | End: 2022-05-27
Payer: COMMERCIAL

## 2022-05-27 LAB
EKG ATRIAL RATE: 72 BPM
EKG DIAGNOSIS: NORMAL
EKG P AXIS: 57 DEGREES
EKG P-R INTERVAL: 150 MS
EKG Q-T INTERVAL: 388 MS
EKG QRS DURATION: 82 MS
EKG QTC CALCULATION (BAZETT): 424 MS
EKG R AXIS: 53 DEGREES
EKG T AXIS: 34 DEGREES
EKG VENTRICULAR RATE: 72 BPM

## 2022-05-27 PROCEDURE — 93010 ELECTROCARDIOGRAM REPORT: CPT | Performed by: INTERNAL MEDICINE

## 2022-05-27 PROCEDURE — 93005 ELECTROCARDIOGRAM TRACING: CPT | Performed by: NURSE PRACTITIONER

## 2022-08-12 DIAGNOSIS — K21.9 GASTROESOPHAGEAL REFLUX DISEASE, UNSPECIFIED WHETHER ESOPHAGITIS PRESENT: ICD-10-CM

## 2022-08-12 RX ORDER — OMEPRAZOLE 40 MG/1
CAPSULE, DELAYED RELEASE ORAL
Qty: 90 CAPSULE | Refills: 1 | Status: SHIPPED | OUTPATIENT
Start: 2022-08-12 | End: 2022-09-16 | Stop reason: SDUPTHER

## 2022-09-13 DIAGNOSIS — E03.9 ACQUIRED HYPOTHYROIDISM: ICD-10-CM

## 2022-09-13 RX ORDER — LEVOTHYROXINE SODIUM 0.05 MG/1
TABLET ORAL
Qty: 90 TABLET | Refills: 1 | Status: SHIPPED | OUTPATIENT
Start: 2022-09-13 | End: 2022-09-16 | Stop reason: SDUPTHER

## 2022-09-16 ENCOUNTER — OFFICE VISIT (OUTPATIENT)
Dept: FAMILY MEDICINE CLINIC | Age: 32
End: 2022-09-16
Payer: COMMERCIAL

## 2022-09-16 VITALS
DIASTOLIC BLOOD PRESSURE: 80 MMHG | BODY MASS INDEX: 34.49 KG/M2 | OXYGEN SATURATION: 98 % | HEIGHT: 68 IN | SYSTOLIC BLOOD PRESSURE: 104 MMHG | HEART RATE: 79 BPM | RESPIRATION RATE: 16 BRPM | WEIGHT: 227.6 LBS

## 2022-09-16 DIAGNOSIS — K21.9 GASTROESOPHAGEAL REFLUX DISEASE, UNSPECIFIED WHETHER ESOPHAGITIS PRESENT: ICD-10-CM

## 2022-09-16 DIAGNOSIS — E28.2 PCOS (POLYCYSTIC OVARIAN SYNDROME): ICD-10-CM

## 2022-09-16 DIAGNOSIS — E03.9 ACQUIRED HYPOTHYROIDISM: ICD-10-CM

## 2022-09-16 DIAGNOSIS — E66.9 OBESITY (BMI 30-39.9): Primary | ICD-10-CM

## 2022-09-16 LAB — TSH REFLEX FT4: 1.45 UIU/ML (ref 0.27–4.2)

## 2022-09-16 PROCEDURE — 99213 OFFICE O/P EST LOW 20 MIN: CPT | Performed by: STUDENT IN AN ORGANIZED HEALTH CARE EDUCATION/TRAINING PROGRAM

## 2022-09-16 RX ORDER — LEVOTHYROXINE SODIUM 0.05 MG/1
TABLET ORAL
Qty: 90 TABLET | Refills: 1 | Status: SHIPPED | OUTPATIENT
Start: 2022-09-16

## 2022-09-16 RX ORDER — OMEPRAZOLE 40 MG/1
CAPSULE, DELAYED RELEASE ORAL
Qty: 90 CAPSULE | Refills: 1 | Status: SHIPPED | OUTPATIENT
Start: 2022-09-16

## 2022-09-16 ASSESSMENT — ENCOUNTER SYMPTOMS
SORE THROAT: 0
ABDOMINAL PAIN: 0
SHORTNESS OF BREATH: 0
NAUSEA: 0
WHEEZING: 0

## 2022-09-16 ASSESSMENT — PATIENT HEALTH QUESTIONNAIRE - PHQ9
SUM OF ALL RESPONSES TO PHQ QUESTIONS 1-9: 0
SUM OF ALL RESPONSES TO PHQ9 QUESTIONS 1 & 2: 0
SUM OF ALL RESPONSES TO PHQ QUESTIONS 1-9: 0
2. FEELING DOWN, DEPRESSED OR HOPELESS: 0
1. LITTLE INTEREST OR PLEASURE IN DOING THINGS: 0

## 2022-09-16 NOTE — PROGRESS NOTES
9/16/2022    Saira Ellis    Chief Complaint   Patient presents with    6 Month Follow-Up     No concerns        HPI  History was obtained from patient. Emelyn Daniel is a 28 y.o. female with a PMHx as listed below who presents today for 6 month folllow up    No acute complaints, complaints   Gerd stable      1. Obesity (BMI 30-39.9)    2. Acquired hypothyroidism    3. Gastroesophageal reflux disease, unspecified whether esophagitis present    4. PCOS (polycystic ovarian syndrome)             REVIEW OF SYMPTOMS    Review of Systems   Constitutional:  Negative for chills and fatigue. HENT:  Negative for congestion and sore throat. Respiratory:  Negative for shortness of breath and wheezing. Cardiovascular:  Negative for chest pain and palpitations. Gastrointestinal:  Negative for abdominal pain and nausea. Genitourinary:  Negative for frequency and urgency. Neurological:  Negative for light-headedness. PAST MEDICAL HISTORY  No past medical history on file. FAMILY HISTORY  Family History   Problem Relation Age of Onset    [de-identified] / Stillbirths Mother     High Blood Pressure Father     High Cholesterol Father        SOCIAL HISTORY  Social History     Socioeconomic History    Marital status:    Tobacco Use    Smoking status: Former     Packs/day: 0.25     Years: 9.00     Pack years: 2.25     Types: Cigarettes     Start date: 2010     Quit date: 4/25/2019     Years since quitting: 3.3    Smokeless tobacco: Never   Vaping Use    Vaping Use: Never used   Substance and Sexual Activity    Alcohol use: Yes     Alcohol/week: 1.0 - 2.0 standard drink     Types: 1 - 2 Cans of beer per week    Drug use: No    Sexual activity: Yes     Partners: Male        SURGICAL HISTORY  No past surgical history on file.               CURRENT MEDICATIONS  Current Outpatient Medications   Medication Sig Dispense Refill    levothyroxine (SYNTHROID) 50 MCG tablet Take one tablet by mouth daily 90 tablet 1    omeprazole (PRILOSEC) 40 MG delayed release capsule TAKE ONE CAPSULE BY MOUTH EVERY MORNING BEFORE BREAKFAST 90 capsule 1    ondansetron (ZOFRAN) 4 MG tablet Take 1 tablet by mouth every 8 hours as needed for Nausea or Vomiting 30 tablet 0    clonazePAM (KLONOPIN) 0.5 MG tablet Take 0.5 mg by mouth daily as needed. Dr Martin Manzo      FLUoxetine (PROZAC) 20 MG capsule Take 20 mg by mouth daily      ibuprofen (ADVIL;MOTRIN) 600 MG tablet Take 1 tablet by mouth every 6 hours as needed for Pain 30 tablet 1    folic acid (V-R FOLIC ACID) 533 MCG tablet Take 1 tablet by mouth daily (Patient not taking: Reported on 9/16/2022) 90 tablet 1     No current facility-administered medications for this visit. ALLERGIES  Allergies   Allergen Reactions    Codeine Palpitations       PHYSICAL EXAM    /80 (Site: Left Upper Arm, Position: Sitting, Cuff Size: Large Adult)   Pulse 79   Resp 16   Ht 5' 8\" (1.727 m)   Wt 227 lb 9.6 oz (103.2 kg)   SpO2 98%   BMI 34.61 kg/m²     Physical Exam  Constitutional:       Appearance: Normal appearance. HENT:      Head: Normocephalic and atraumatic. Eyes:      Extraocular Movements: Extraocular movements intact. Pupils: Pupils are equal, round, and reactive to light. Cardiovascular:      Rate and Rhythm: Normal rate and regular rhythm. Pulses: Normal pulses. Heart sounds: No murmur heard. No friction rub. No gallop. Pulmonary:      Effort: Pulmonary effort is normal.      Breath sounds: Normal breath sounds. Skin:     General: Skin is warm and dry. Neurological:      General: No focal deficit present. Mental Status: She is alert. Psychiatric:         Mood and Affect: Mood normal.         Behavior: Behavior normal.       ASSESSMENT & PLAN    1. Acquired hypothyroidism    - levothyroxine (SYNTHROID) 50 MCG tablet; Take one tablet by mouth daily  Dispense: 90 tablet; Refill: 1  - TSH with Reflex to FT4; Future    2.  Gastroesophageal reflux disease, unspecified whether esophagitis present    - omeprazole (PRILOSEC) 40 MG delayed release capsule; TAKE ONE CAPSULE BY MOUTH EVERY MORNING BEFORE BREAKFAST  Dispense: 90 capsule; Refill: 1    3. Obesity (BMI 30-39.9)      4. PCOS (polycystic ovarian syndrome)    Trial of stopping Synthroid repeat in 2 months  GERD stable      No follow-ups on file.          Electronically signed by Kat Taylor DO on 9/16/2022

## 2022-12-14 DIAGNOSIS — E66.9 OBESITY (BMI 30-39.9): Primary | ICD-10-CM

## 2022-12-14 DIAGNOSIS — E28.2 PCOS (POLYCYSTIC OVARIAN SYNDROME): ICD-10-CM

## 2023-01-11 ENCOUNTER — PATIENT MESSAGE (OUTPATIENT)
Dept: FAMILY MEDICINE CLINIC | Age: 33
End: 2023-01-11

## 2023-01-12 NOTE — TELEPHONE ENCOUNTER
From: Diane Marx  To: Dr. Giraldo Park: 1/11/2023 12:09 PM EST  Subject: Check in    Hello over the last 6 months Ive noticed Im increasingly fatigued. I can get 8 hours of sleep and then take a two hour nap daily. I feel exhausted all the time. My face has fully rounded out which is probably the weight gain but I cant get it off. I wake up achy and stiff my brain fog is horrible [place] been very forgetful at home and at work and its getting really hard to function like that. Im not sure if you have any ideas on what blood work tests we can run or anything else. I just dont feel like myself and its gotten a lot worse lately and really hard to function with the fatigue stiffness and forgetfulness. Let me know, thank you.

## 2023-01-19 DIAGNOSIS — E03.9 ACQUIRED HYPOTHYROIDISM: ICD-10-CM

## 2023-01-20 RX ORDER — LEVOTHYROXINE SODIUM 0.05 MG/1
TABLET ORAL
Qty: 90 TABLET | Refills: 0 | Status: SHIPPED | OUTPATIENT
Start: 2023-01-20

## 2023-04-26 ENCOUNTER — TELEPHONE (OUTPATIENT)
Dept: FAMILY MEDICINE CLINIC | Age: 33
End: 2023-04-26

## 2023-04-26 NOTE — TELEPHONE ENCOUNTER
----- Message from Interianoalbert De La O sent at 4/26/2023  7:53 AM EDT -----  Regarding: Walking heart rate average   Contact: [phone]  Okay I feel really stupid asking this question but my mom has tachycardia and takes meds and my dad has a fib so my heart has always been on my list of things I worry about lol. Dr Deo Daley recently put me on a low dose of short acting Ritalin 2 times a day. My heart rate has always been a little faster but I was messing around at work and my bp was great 102/64 when I looked at my Apple Watch my walking average was 119 as I went back through the data its always been around 112-119 my resting is usually 65-85 depending on work etc. I know the Ritalin could slightly increase my heart rate and I used to take it but I havent started it back up due to the fact my heart rate walking average is already around 120. Is this something concerning? Again Bernardo always had a speedier heart rate, I work 12 hours shifts probably dont hydrate enough etc. Im working on cutting carbs and eating right and exercising, I have an appt in June with you but just wanted to check in and make sure before   starting the Ritalin it was okay. Again my Bp is always great. I may be more like my mom but I dont want to take meds. I did see a cardio back in 2020 (dr Jenni Luther) who said everything was fine I cld do meds for tachycardia but he didnt see the need at this time. Also I do limit my caffeine to almost nothing.  Thank you

## 2023-04-26 NOTE — TELEPHONE ENCOUNTER
Aravind Young, having a high HR is a little concerning. It very likely could be the Ritalin andymigth take your body one week to adjust vs. Perhaps cutting the dose or thinking of a non-stimulant ADHD medication.  If HR at rest above 100 persistently I would contact Dr. Ludwin Dee and discuss other options

## 2023-06-16 PROBLEM — E66.01 SEVERE OBESITY (BMI 35.0-39.9) WITH COMORBIDITY (HCC): Status: ACTIVE | Noted: 2023-06-16

## 2023-07-13 ENCOUNTER — HOSPITAL ENCOUNTER (OUTPATIENT)
Age: 33
Setting detail: SPECIMEN
Discharge: HOME | End: 2023-07-13

## 2023-07-14 ENCOUNTER — TELEPHONE (OUTPATIENT)
Dept: FAMILY MEDICINE CLINIC | Age: 33
End: 2023-07-14

## 2023-07-14 LAB
25(OH)D3 SERPL-MCNC: 26.4 NG/ML
ALBUMIN SERPL-MCNC: 4.3 G/DL (ref 3.5–5.2)
ALBUMIN/GLOB SERPL: 1.7 {RATIO} (ref 1–2.5)
ALP SERPL-CCNC: 83 U/L (ref 35–104)
ALT SERPL-CCNC: 24 U/L (ref 5–33)
ANION GAP SERPL CALCULATED.3IONS-SCNC: 13 MMOL/L (ref 9–17)
AST SERPL-CCNC: 19 U/L
BASOPHILS # BLD: 0.05 K/UL (ref 0–0.2)
BASOPHILS NFR BLD: 1 % (ref 0–2)
BILIRUB SERPL-MCNC: 0.2 MG/DL (ref 0.3–1.2)
BUN SERPL-MCNC: 12 MG/DL (ref 6–20)
CALCIUM SERPL-MCNC: 9.3 MG/DL (ref 8.6–10.4)
CHLORIDE SERPL-SCNC: 102 MMOL/L (ref 98–107)
CO2 SERPL-SCNC: 24 MMOL/L (ref 20–31)
CREAT SERPL-MCNC: 0.7 MG/DL (ref 0.5–0.9)
EOSINOPHIL # BLD: 0.16 K/UL (ref 0–0.44)
EOSINOPHILS RELATIVE PERCENT: 2 % (ref 1–4)
ERYTHROCYTE [DISTWIDTH] IN BLOOD BY AUTOMATED COUNT: 13.2 % (ref 11.8–14.4)
FOLATE SERPL-MCNC: 8.5 NG/ML
GFR SERPL CREATININE-BSD FRML MDRD: >60 ML/MIN/1.73M2
GLUCOSE SERPL-MCNC: 116 MG/DL (ref 70–99)
HCT VFR BLD AUTO: 39.5 % (ref 36.3–47.1)
HGB BLD-MCNC: 12.7 G/DL (ref 11.9–15.1)
IMM GRANULOCYTES # BLD AUTO: 0.04 K/UL (ref 0–0.3)
IMM GRANULOCYTES NFR BLD: 0 %
LYMPHOCYTES # BLD: 27 % (ref 24–43)
LYMPHOCYTES NFR BLD: 2.83 K/UL (ref 1.1–3.7)
MCH RBC QN AUTO: 29 PG (ref 25.2–33.5)
MCHC RBC AUTO-ENTMCNC: 32.2 G/DL (ref 28.4–34.8)
MCV RBC AUTO: 90.2 FL (ref 82.6–102.9)
MONOCYTES NFR BLD: 0.38 K/UL (ref 0.1–1.2)
MONOCYTES NFR BLD: 4 % (ref 3–12)
NEUTROPHILS NFR BLD: 66 % (ref 36–65)
NEUTS SEG NFR BLD: 7.05 K/UL (ref 1.5–8.1)
NRBC BLD-RTO: 0 PER 100 WBC
PLATELET # BLD AUTO: 348 K/UL (ref 138–453)
PMV BLD AUTO: 10.3 FL (ref 8.1–13.5)
POTASSIUM SERPL-SCNC: 3.8 MMOL/L (ref 3.7–5.3)
PROT SERPL-MCNC: 6.9 G/DL (ref 6.4–8.3)
RBC # BLD AUTO: 4.38 M/UL (ref 3.95–5.11)
SODIUM SERPL-SCNC: 139 MMOL/L (ref 135–144)
TSH SERPL DL<=0.05 MIU/L-ACNC: 1.54 UIU/ML (ref 0.3–5)
VIT B12 SERPL-MCNC: 632 PG/ML (ref 232–1245)
WBC OTHER # BLD: 10.5 K/UL (ref 3.5–11.3)

## 2023-07-17 ENCOUNTER — PATIENT MESSAGE (OUTPATIENT)
Dept: FAMILY MEDICINE CLINIC | Age: 33
End: 2023-07-17

## 2023-07-17 DIAGNOSIS — K21.9 GASTROESOPHAGEAL REFLUX DISEASE, UNSPECIFIED WHETHER ESOPHAGITIS PRESENT: ICD-10-CM

## 2023-07-18 RX ORDER — OMEPRAZOLE 40 MG/1
CAPSULE, DELAYED RELEASE ORAL
Qty: 90 CAPSULE | Refills: 1 | Status: SHIPPED | OUTPATIENT
Start: 2023-07-18

## 2023-08-03 DIAGNOSIS — E55.9 VITAMIN D DEFICIENCY: Primary | ICD-10-CM

## 2023-08-03 RX ORDER — CHOLECALCIFEROL (VITAMIN D3) 50 MCG
2000 TABLET ORAL DAILY
Qty: 90 TABLET | Refills: 1 | Status: SHIPPED | OUTPATIENT
Start: 2023-08-03 | End: 2024-01-30

## 2023-08-11 ENCOUNTER — OFFICE VISIT (OUTPATIENT)
Dept: FAMILY MEDICINE CLINIC | Age: 33
End: 2023-08-11
Payer: COMMERCIAL

## 2023-08-11 VITALS
HEIGHT: 68 IN | SYSTOLIC BLOOD PRESSURE: 110 MMHG | DIASTOLIC BLOOD PRESSURE: 72 MMHG | WEIGHT: 237.2 LBS | OXYGEN SATURATION: 98 % | BODY MASS INDEX: 35.95 KG/M2 | HEART RATE: 84 BPM

## 2023-08-11 DIAGNOSIS — G89.29 CHRONIC INTRACTABLE HEADACHE, UNSPECIFIED HEADACHE TYPE: ICD-10-CM

## 2023-08-11 DIAGNOSIS — R51.9 CHRONIC INTRACTABLE HEADACHE, UNSPECIFIED HEADACHE TYPE: Primary | ICD-10-CM

## 2023-08-11 DIAGNOSIS — R51.9 CHRONIC INTRACTABLE HEADACHE, UNSPECIFIED HEADACHE TYPE: ICD-10-CM

## 2023-08-11 DIAGNOSIS — G89.29 CHRONIC INTRACTABLE HEADACHE, UNSPECIFIED HEADACHE TYPE: Primary | ICD-10-CM

## 2023-08-11 LAB
CRP SERPL-MCNC: 3.9 MG/L (ref 0–5.1)
ERYTHROCYTE [SEDIMENTATION RATE] IN BLOOD BY WESTERGREN METHOD: 10 MM/HR (ref 0–20)

## 2023-08-11 PROCEDURE — 99213 OFFICE O/P EST LOW 20 MIN: CPT | Performed by: STUDENT IN AN ORGANIZED HEALTH CARE EDUCATION/TRAINING PROGRAM

## 2023-08-11 ASSESSMENT — ENCOUNTER SYMPTOMS
SHORTNESS OF BREATH: 0
ABDOMINAL PAIN: 0
SORE THROAT: 0
WHEEZING: 0
NAUSEA: 0

## 2023-08-11 NOTE — PROGRESS NOTES
8/20/2023    Isabella Ken    Chief Complaint   Patient presents with    Headache     Pt states has increased pain and frequency in past 2 weeks , has seen eye dr recently who recommended she see neuro       HPI  History was obtained from patient. Markus Wynn is a 35 y.o. female with a PMHx as listed below who presents today for acute complaint of headaches    Headaches are not new, however they are worsening she states she usually gets botox but she changed to dysport    Headaches described as pressure in the front. No known triggers she is not sure if related to anxiety     No issues smells, lights. Denies any changes in vision, double vision,   Admits to numbness hands intermittently in AM         1. Chronic intractable headache, unspecified headache type             REVIEW OF SYMPTOMS    Review of Systems   Constitutional:  Negative for chills and fatigue. HENT:  Negative for congestion and sore throat. Respiratory:  Negative for shortness of breath and wheezing. Cardiovascular:  Negative for chest pain and palpitations. Gastrointestinal:  Negative for abdominal pain and nausea. Genitourinary:  Negative for frequency and urgency. Neurological:  Negative for light-headedness. PAST MEDICAL HISTORY  No past medical history on file. FAMILY HISTORY  Family History   Problem Relation Age of Onset    Miscarriages / Stillbirths Mother     High Blood Pressure Father     High Cholesterol Father        SOCIAL HISTORY  Social History     Socioeconomic History    Marital status:    Tobacco Use    Smoking status: Every Day     Packs/day: 0.50     Years: 9.00     Pack years: 4.50     Types: Cigarettes     Start date: 2010    Smokeless tobacco: Never   Vaping Use    Vaping Use: Never used   Substance and Sexual Activity    Alcohol use:  Yes     Alcohol/week: 1.0 - 2.0 standard drink     Types: 1 - 2 Cans of beer per week    Drug use: No    Sexual activity: Yes     Partners: Male        SURGICAL

## 2023-09-27 DIAGNOSIS — E03.9 ACQUIRED HYPOTHYROIDISM: ICD-10-CM

## 2023-09-28 RX ORDER — LEVOTHYROXINE SODIUM 0.05 MG/1
TABLET ORAL
Qty: 90 TABLET | Refills: 0 | Status: SHIPPED | OUTPATIENT
Start: 2023-09-28

## 2023-12-20 DIAGNOSIS — E03.9 ACQUIRED HYPOTHYROIDISM: ICD-10-CM

## 2023-12-20 RX ORDER — LEVOTHYROXINE SODIUM 0.05 MG/1
TABLET ORAL
Qty: 90 TABLET | Refills: 1 | Status: SHIPPED | OUTPATIENT
Start: 2023-12-20 | End: 2024-06-20

## 2024-01-14 DIAGNOSIS — K21.9 GASTROESOPHAGEAL REFLUX DISEASE, UNSPECIFIED WHETHER ESOPHAGITIS PRESENT: ICD-10-CM

## 2024-01-15 RX ORDER — OMEPRAZOLE 40 MG/1
CAPSULE, DELAYED RELEASE ORAL
Qty: 90 CAPSULE | Refills: 1 | Status: SHIPPED | OUTPATIENT
Start: 2024-01-15

## 2024-01-15 RX ORDER — OMEPRAZOLE 40 MG/1
CAPSULE, DELAYED RELEASE ORAL
Qty: 90 CAPSULE | Refills: 1 | OUTPATIENT
Start: 2024-01-15

## 2024-03-01 ENCOUNTER — OFFICE VISIT (OUTPATIENT)
Dept: NEUROLOGY | Age: 34
End: 2024-03-01
Payer: COMMERCIAL

## 2024-03-01 VITALS
HEART RATE: 100 BPM | SYSTOLIC BLOOD PRESSURE: 126 MMHG | OXYGEN SATURATION: 98 % | WEIGHT: 233.4 LBS | BODY MASS INDEX: 36.63 KG/M2 | HEIGHT: 67 IN | DIASTOLIC BLOOD PRESSURE: 84 MMHG

## 2024-03-01 DIAGNOSIS — H47.10 OPTIC DISC EDEMA: Primary | ICD-10-CM

## 2024-03-01 DIAGNOSIS — G44.219 EPISODIC TENSION-TYPE HEADACHE, NOT INTRACTABLE: ICD-10-CM

## 2024-03-01 PROCEDURE — 99205 OFFICE O/P NEW HI 60 MIN: CPT | Performed by: STUDENT IN AN ORGANIZED HEALTH CARE EDUCATION/TRAINING PROGRAM

## 2024-03-01 RX ORDER — M-VIT,TX,IRON,MINS/CALC/FOLIC 27MG-0.4MG
1 TABLET ORAL DAILY
COMMUNITY

## 2024-03-01 NOTE — PROGRESS NOTES
will initiate acetazolamide.  I spent time today discussing why it is important to follow through with the workup as IIH can result in loss of vision should not be treated adequately. We discussed that in rare cases this could result in need for neurosurgical evaluation and potential shunt placement.  I am reassured by her neurologic examination and the fact that her headaches have significantly improved.  At present time she is experiencing perhaps 2 headaches per week that sound most consistent with tension type headache.  These resolve well with over-the-counter ibuprofen.  As such, I will not be making any changes to her medication regiment until further workup is completed.  She was agreeable to this plan.    Thank you for allowing us to participate in the care of your patient.  If there are any questions regarding evaluation please feel free to contact us.     Elissa Wayne DO, 3/1/2024  >60 minutes total time spent regarding this patient's encounter.  This included obtaining history, performing a neurological medical exam, developing an assessment / plan, and documenting via EMR.

## 2024-03-06 ENCOUNTER — PATIENT MESSAGE (OUTPATIENT)
Dept: NEUROLOGY | Age: 34
End: 2024-03-06

## 2024-03-06 DIAGNOSIS — H47.10 OPTIC DISC EDEMA: Primary | ICD-10-CM

## 2024-03-06 NOTE — TELEPHONE ENCOUNTER
Spoke with Karyn at [facility], she is to fax ov note when completed.

## 2024-03-06 NOTE — TELEPHONE ENCOUNTER
Lubna Benjaminah, LPN 3/6/2024 12:34 PM EST      ----- Message -----  From: Ileana Davidson  Sent: 3/6/2024 12:31 PM EST  To: [facility] Clinical Staff  Subject: Eye exam complete     Hi-I saw Dr Villa today, he said he would send the report over, but did see mild edema :(. I know the next step is the LP, would it be okay to hold off on that until next month possibly? We go on vacation for spring break at the end of the month. If we shouldn’t wait that’s okay too. I also was curious from the report if we know for sure that I will probably have to start diamox or there is still a chance my LP can come back normal, and I will not need medication. Thank you.

## 2024-03-13 ENCOUNTER — TELEPHONE (OUTPATIENT)
Dept: NEUROLOGY | Age: 34
End: 2024-03-13

## 2024-03-13 NOTE — TELEPHONE ENCOUNTER
Received call from Corinne with [facility] scheduling stating pt is scheduled for LP 4/8/24 at 1:00 for an 11:30 arrival time. Pt can call [phone] to reschedule if needed.

## 2024-03-20 NOTE — PROGRESS NOTES
IR Procedure at [facility]:Spoke with patient and she will arrive at 1130 at [facility] on 4/8/2024 for her procedure at 1300, also went over below instructions and told patient to take her medications as scheduled.    NPO at Midnight      2.   Follow your directions as prescribed by the doctor for your procedure and medications.  3.   Consult your provider as to when to stop blood thinner  4.   Do not take any pain medication within 6 hours of your procedure  5.   Do not drink any alcoholic beverages or use any street drugs 24 hours before procedure.  6.   Please wear simple, loose fitting clothing to the hospital.  Do not bring valuables (money,             credit cards, checkbooks, etc.)     7.   If you  have a Living Will and Durable Power of  for Healthcare, please bring in a copy.  8.   Please bring picture ID,  insurance card, paperwork from the doctors office            (H & P, Consent,  & card for implantable devices).  9.   Report to the information desk on the ground floor.  10. Take a shower the night before or morning of your procedure, do not apply any lotion, oil or powder.  11. If you are going to be sedated for the procedure, you will need a responsible adult to drive you home.

## 2024-04-08 ENCOUNTER — HOSPITAL ENCOUNTER (OUTPATIENT)
Dept: GENERAL RADIOLOGY | Age: 34
Discharge: HOME | End: 2024-04-08
Payer: COMMERCIAL

## 2024-04-08 VITALS
RESPIRATION RATE: 18 BRPM | DIASTOLIC BLOOD PRESSURE: 70 MMHG | TEMPERATURE: 98.1 F | SYSTOLIC BLOOD PRESSURE: 118 MMHG | OXYGEN SATURATION: 96 % | HEART RATE: 77 BPM

## 2024-04-08 DIAGNOSIS — H47.10 OPTIC DISC EDEMA: ICD-10-CM

## 2024-04-08 LAB
GLUCOSE, CSF: 55 MG/DL (ref 40–75)
LYMPHS CSF: ABNORMAL %
PREGNANCY TEST URINE, POC: NEGATIVE
PROTEIN CSF: 23 MG/DL (ref 15–45)
RBC CSF: 2 CU MM
WBC CSF: 4 CU MM (ref 0–5)

## 2024-04-08 PROCEDURE — 62328 DX LMBR SPI PNXR W/FLUOR/CT: CPT

## 2024-04-08 PROCEDURE — 87070 CULTURE OTHR SPECIMN AEROBIC: CPT

## 2024-04-08 PROCEDURE — 82945 GLUCOSE OTHER FLUID: CPT

## 2024-04-08 PROCEDURE — 81025 URINE PREGNANCY TEST: CPT

## 2024-04-08 PROCEDURE — 89051 BODY FLUID CELL COUNT: CPT

## 2024-04-08 PROCEDURE — 84157 ASSAY OF PROTEIN OTHER: CPT

## 2024-04-08 ASSESSMENT — PAIN DESCRIPTION - DESCRIPTORS: DESCRIPTORS: ACHING

## 2024-04-08 ASSESSMENT — PAIN SCALES - GENERAL
PAINLEVEL_OUTOF10: 0
PAINLEVEL_OUTOF10: 0
PAINLEVEL_OUTOF10: 2

## 2024-04-08 ASSESSMENT — PAIN - FUNCTIONAL ASSESSMENT: PAIN_FUNCTIONAL_ASSESSMENT: ACTIVITIES ARE NOT PREVENTED

## 2024-04-08 ASSESSMENT — PAIN DESCRIPTION - LOCATION: LOCATION: HEAD

## 2024-04-08 ASSESSMENT — PAIN DESCRIPTION - PAIN TYPE: TYPE: ACUTE PAIN

## 2024-04-08 ASSESSMENT — PAIN DESCRIPTION - FREQUENCY: FREQUENCY: CONTINUOUS

## 2024-04-08 ASSESSMENT — PAIN DESCRIPTION - ONSET: ONSET: GRADUAL

## 2024-04-08 NOTE — PROGRESS NOTES
1454 - received patient from transport, patient A&O, denies nausea, reports slight headache, 1-2/10, patient lying with head slightly elevated.  1505 - called radiology for orders, waiting for doctor to put them in, patient given diet starry and crackers  1625 - VSS, patient denies pain or nausea, discharge instructions given to patient and mother, understanding voiced without any further questions at this time.  1630 - iv removed  1642 - patient left sds via wheelchair accompanied by nurse, patient safely in car

## 2024-04-08 NOTE — DISCHARGE INSTRUCTIONS
Patient Discharge Instructions  Lumbar Puncture      You should rest with your head flat for 10-12 hours after the procedure.    You should restrict your activity for 24 hours following the procedure.      You should not engage in any strenuous activity, no heavy lifting or bending.    A headache is normal for the first day after your test.   You may take Tylenol over the counter as directed for discomfort.    You should drink at least 4-6 eight ounce glasses of clear liquids today  .  You may place an ice pack on the puncture site for 30 minutes four times per day as needed for discomfort.    If you have a headache that persists or is worsening, develop a fever, swelling or redness at the puncture site, or any bleeding or drainage from the puncture site, you should contact the radiologist or your physician immediately.  The radiologist or radiology nurse can be contacted at [phone].

## 2024-04-09 ENCOUNTER — PATIENT MESSAGE (OUTPATIENT)
Dept: NEUROLOGY | Age: 34
End: 2024-04-09

## 2024-04-09 LAB
CULTURE: ABNORMAL
GRAM SMEAR: ABNORMAL
Lab: ABNORMAL
SPECIMEN: ABNORMAL

## 2024-04-09 NOTE — TELEPHONE ENCOUNTER
From: Ileana Davidson  Sent: 4/9/2024 10:40 AM EDT  To: [facility] Clinical Staff  Subject: LP    Good morning-that is great news!     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     So continue seeing dr arita and he will update you on the swelling? Do I need to keep my appt with you guys in May or when would you like to see me back?     Thanks so much   Ileana

## 2024-04-11 ENCOUNTER — HOSPITAL ENCOUNTER (EMERGENCY)
Age: 34
Discharge: HOME | End: 2024-04-11
Attending: EMERGENCY MEDICINE
Payer: COMMERCIAL

## 2024-04-11 VITALS
OXYGEN SATURATION: 99 % | SYSTOLIC BLOOD PRESSURE: 118 MMHG | TEMPERATURE: 97.7 F | DIASTOLIC BLOOD PRESSURE: 72 MMHG | HEART RATE: 82 BPM | RESPIRATION RATE: 16 BRPM

## 2024-04-11 DIAGNOSIS — M54.50 ACUTE RIGHT-SIDED LOW BACK PAIN WITHOUT SCIATICA: ICD-10-CM

## 2024-04-11 DIAGNOSIS — G97.1 HEADACHE, POST-LUMBAR PUNCTURE: Primary | ICD-10-CM

## 2024-04-11 LAB
BILIRUBIN URINE: NEGATIVE MG/DL
BLOOD, URINE: NEGATIVE
CLARITY: CLEAR
COLOR: YELLOW
COMMENT UA: NORMAL
GLUCOSE, URINE: NEGATIVE MG/DL
INTERPRETATION: NORMAL
KETONES, URINE: NEGATIVE MG/DL
LEUKOCYTE ESTERASE, URINE: NEGATIVE
NITRITE URINE, QUANTITATIVE: NEGATIVE
PH, URINE: 7 (ref 5–8)
PREGNANCY, URINE: NEGATIVE
PROTEIN UA: NEGATIVE MG/DL
SPECIFIC GRAVITY UA: <1.005 (ref 1–1.03)
UROBILINOGEN, URINE: 0.2 MG/DL (ref 0.2–1)

## 2024-04-11 PROCEDURE — 96374 THER/PROPH/DIAG INJ IV PUSH: CPT

## 2024-04-11 PROCEDURE — 81003 URINALYSIS AUTO W/O SCOPE: CPT

## 2024-04-11 PROCEDURE — 99284 EMERGENCY DEPT VISIT MOD MDM: CPT

## 2024-04-11 PROCEDURE — 81025 URINE PREGNANCY TEST: CPT

## 2024-04-11 PROCEDURE — 2580000003 HC RX 258: Performed by: NURSE PRACTITIONER

## 2024-04-11 PROCEDURE — 6360000002 HC RX W HCPCS: Performed by: NURSE PRACTITIONER

## 2024-04-11 RX ORDER — METOCLOPRAMIDE HYDROCHLORIDE 5 MG/ML
10 INJECTION INTRAMUSCULAR; INTRAVENOUS ONCE
Status: DISCONTINUED | OUTPATIENT
Start: 2024-04-11 | End: 2024-04-11 | Stop reason: HOSPADM

## 2024-04-11 RX ORDER — KETOROLAC TROMETHAMINE 15 MG/ML
30 INJECTION, SOLUTION INTRAMUSCULAR; INTRAVENOUS ONCE
Status: COMPLETED | OUTPATIENT
Start: 2024-04-11 | End: 2024-04-11

## 2024-04-11 RX ORDER — 0.9 % SODIUM CHLORIDE 0.9 %
500 INTRAVENOUS SOLUTION INTRAVENOUS ONCE
Status: COMPLETED | OUTPATIENT
Start: 2024-04-11 | End: 2024-04-11

## 2024-04-11 RX ORDER — DIPHENHYDRAMINE HYDROCHLORIDE 50 MG/ML
50 INJECTION INTRAMUSCULAR; INTRAVENOUS ONCE
Status: DISCONTINUED | OUTPATIENT
Start: 2024-04-11 | End: 2024-04-11 | Stop reason: HOSPADM

## 2024-04-11 RX ADMIN — SODIUM CHLORIDE 500 ML: 9 INJECTION, SOLUTION INTRAVENOUS at 15:53

## 2024-04-11 RX ADMIN — KETOROLAC TROMETHAMINE 30 MG: 15 INJECTION, SOLUTION INTRAMUSCULAR; INTRAVENOUS at 16:13

## 2024-04-11 ASSESSMENT — PAIN SCALES - GENERAL
PAINLEVEL_OUTOF10: 8
PAINLEVEL_OUTOF10: 5
PAINLEVEL_OUTOF10: 2

## 2024-04-11 ASSESSMENT — PAIN DESCRIPTION - DESCRIPTORS: DESCRIPTORS: ACHING;DISCOMFORT;SORE

## 2024-04-11 ASSESSMENT — PAIN DESCRIPTION - ORIENTATION: ORIENTATION: RIGHT;MID;UPPER

## 2024-04-11 ASSESSMENT — PAIN DESCRIPTION - LOCATION: LOCATION: BACK

## 2024-04-11 ASSESSMENT — PAIN - FUNCTIONAL ASSESSMENT
PAIN_FUNCTIONAL_ASSESSMENT: PREVENTS OR INTERFERES SOME ACTIVE ACTIVITIES AND ADLS
PAIN_FUNCTIONAL_ASSESSMENT: 0-10

## 2024-04-11 NOTE — TELEPHONE ENCOUNTER
Verified with Kwan that since pt had LP and having headache she should go to ED and called pt to recommend she be evaluated in ED. She voiced understanding.

## 2024-04-11 NOTE — ED PROVIDER NOTES
I independently examined and evaluated Ileana Davidson.  I personally saw the patient and made/approved the management plan and take responsibility of the patient management.     In brief their history revealed patient is here with back pain and headache after diagnostic LP for her papilledema.  Patient underwent procedure several days ago and has been having right-sided back pain radiating upper back as well as a headache since then.  Patient reports symptoms are quite mild at this time and controlled with just anti-inflammatories and Tylenol.  Patient does report that laying down seems to help the headache and sitting up makes it worse.  Patient denies any numbness or weakness.  No sudden onset worsening of life.  No fevers.  Neck stiffness.  Patient called her neurology office and they encouraged her to come to the emergency department for evaluation.    Their focused exam revealed the patient is afebrile and hemodynamically stable on room air.  The patient appears age appropriate, appears well-hydrated, well-nourished.  Sitting upright in bed.  Appears well.  Very pleasant. Mucous membranes are moist. Speech is clear. Breathing is unlabored.  Speaking clearly in full sentences.  No respiratory distress.  Skin is dry. Mental status is normal.  There is no rash to the affected back.  There is some tenderness to palpation in the paraspinal musculature in the lumbar region.  No midline bony tenderness.  The patient moves all extremities and is without facial droop. Sensation intact to light touch to distal upper/lower extremities; 5/5 and symmetric shrug, , HF, KF/KE, and dorsi/plantar flexion; symmetric brow raise and nasolabial folds, tongue midline; FTN and MAURO intact; 2+ and symmetric reflexes to bilateral upper/lower extremities; ambulates with steady gait; no dysarthria or aphasia.    ED course:   CC/HPI Summary, DDx, ED Course, and Reassessment:   Pt presents as above.  Emergent conditions considered.   disposition, reasons for consults, results of consults, etc. were made by myself in conjunction with the Advanced Practice Provider.        For all further details of the patient's emergency department visit, please see the Advanced Practice Provider's documentation.        Nelson Cruz MD  04/12/24 0035

## 2024-04-11 NOTE — ED PROVIDER NOTES
[facility] EMERGENCY DEPARTMENT  EMERGENCY DEPARTMENT ENCOUNTER      Pt Name: Ileana Davidson  MRN: [id number]  Birthdate 1990  Date of evaluation: 4/11/2024  Provider: OUMOU Ochoa - CNP  PCP: Jn Juan DO  Note Started: 3:09 PM EDT 4/11/24    I am the Primary Clinician of Record.  ARLENE. I have evaluated this patient.    CHIEF COMPLAINT       Chief Complaint   Patient presents with    Back Pain     LP on Monday, having back pain and headache       HISTORY OF PRESENT ILLNESS: 1 or more Elements   Ileana Davidson is a 33 y.o. female presents to the emergency department for a headache that has been ongoing for the past 3 days secondary to having an LP done.   Associated symptoms include back pain. There was no thunderclap presentation.  Denies head trauma or loss of consciousness, neck pain, neck stiffness, denies vision loss or blurred vision, denies any parenthesis, denies general or unilateral weakness.  Patient denies recent travel, denies recent surgery, denies recent exogenous hormone use. Patient was noted to ambulate without difficulty into the emergency department.  She denies back pain red flags.    I have reviewed the nursing triage documentation and agree unless otherwise noted.  REVIEW OF SYSTEMS :    Review of Systems    CONSTITUTIONAL:  Denies fever, chills, weight loss or weakness  EYES:  Denies photophobia or discharge  ENT:  Denies sore throat or ear pain  CARDIOVASCULAR:  Denies chest pain, palpitations or swelling  RESPIRATORY:  Denies cough or shortness of breath  GI:  Denies abdominal pain, nausea, vomiting, or diarrhea  MUSCULOSKELETAL: +back pain  SKIN:  No rash  NEUROLOGIC:  Complains of headache, denies focal weakness or sensory changes    Positives and Pertinent negatives as per HPI.   SURGICAL HISTORY   No past surgical history on file.    CURRENTMEDICATIONS       Previous Medications    CLONAZEPAM (KLONOPIN) 0.5 MG TABLET     reassess after treatment.    Medications   metoclopramide (REGLAN) injection 10 mg (10 mg IntraVENous Not Given 4/11/24 1602)   diphenhydrAMINE (BENADRYL) injection 50 mg (50 mg IntraVENous Not Given 4/11/24 1601)   ketorolac (TORADOL) injection 30 mg (30 mg IntraVENous Given 4/11/24 1613)   sodium chloride 0.9 % bolus 500 mL (500 mLs IntraVENous New Bag 4/11/24 1553)   The patient ultimately decided not to take any medication and only to get IV fluid.  She states that her headache is mild at 3/10.  And she would like to continue just taking her Tylenol/Motrin.    All the patient's questions were answered to their satisfaction and they verbalized understanding of the possible diagnoses/need for close follow up. Patient discharged in stable condition with review of strict ED return guidelines prior to discharge.        CONSULTS: None      Patient’s care significantly limited by social determinants of health including:  na    Disposition Considerations (tests considered but not done, Shared Decision Making, Pt Expectation of Test or Tx.):   Appropriate for discharge.  Strict ED return guidelines reviewed.  Patient discharged in stable condition.    FINAL IMPRESSION      1. Headache, post-lumbar puncture    2. Acute right-sided low back pain without sciatica          DISPOSITION/PLAN   DISPOSITION Decision To Discharge 04/11/2024 04:59:06 PM      PATIENT REFERRED TO:  Jn Juan,   [address]  [address]  [place] [address]  [phone]    Go to   follow up    Elissa Wayne DO  [address]  [address]  [place] [id number]  [phone]    Go in 1 week  follow up      DISCHARGE MEDICATIONS:  New Prescriptions    No medications on file          (Please note that portions of this note were completed with a voice recognition program.  Efforts were made to edit the dictations but occasionally words are mis-transcribed.)    Itzel Darby, OUMOU - CNP (electronically signed)      Itzel Darby

## 2024-04-16 LAB
CULTURE: ABNORMAL
GRAM SMEAR: ABNORMAL
Lab: ABNORMAL
SPECIMEN: ABNORMAL

## 2024-05-03 ENCOUNTER — OFFICE VISIT (OUTPATIENT)
Dept: NEUROLOGY | Age: 34
End: 2024-05-03
Payer: COMMERCIAL

## 2024-05-03 VITALS
HEART RATE: 104 BPM | SYSTOLIC BLOOD PRESSURE: 139 MMHG | WEIGHT: 234.6 LBS | BODY MASS INDEX: 36.74 KG/M2 | DIASTOLIC BLOOD PRESSURE: 74 MMHG | OXYGEN SATURATION: 96 %

## 2024-05-03 DIAGNOSIS — G44.219 EPISODIC TENSION-TYPE HEADACHE, NOT INTRACTABLE: ICD-10-CM

## 2024-05-03 DIAGNOSIS — H47.10 OPTIC DISC EDEMA: Primary | ICD-10-CM

## 2024-05-03 PROCEDURE — 99214 OFFICE O/P EST MOD 30 MIN: CPT | Performed by: NURSE PRACTITIONER

## 2024-05-03 RX ORDER — TIZANIDINE 4 MG/1
4 TABLET ORAL NIGHTLY
Qty: 30 TABLET | Refills: 3 | Status: SHIPPED | OUTPATIENT
Start: 2024-05-03

## 2024-05-03 NOTE — PROGRESS NOTES
5/3/24    Ileana Davidson  1990    Chief Complaint   Patient presents with    Follow-up     2M follow up for Optic disc edema.          History of Present Illness  Ileana is a 33 y.o. female presenting today for follow-up of: optic disc edema.    Initially, there was some concern for IIH due to mild papilledema on ophthalmology exam, lumbar puncture showed opening pressure of 20, not consistent with IIH.  She was encouraged to keep routine follow-ups with ophthalmology to monitor papilledema.    She did have a headache after her LP and went to the emergency department.  Her headache resolved with Toradol.     Last visit she was having about 2 headaches per week consistent with tension type headaches.  They resolve with over-the-counter ibuprofen. Today, she is having about 8 tension headaches per month.  Her headaches are not associated with light/sound sensitivity, nausea/vomiting, she denies vision changes or tinnitus.    She is working on weight loss.  She has had an in-home sleep study in the past, she was not diagnosed with MICHEL.    She sees Dr. Lr again in September.    Current Outpatient Medications   Medication Sig Dispense Refill    tiZANidine (ZANAFLEX) 4 MG tablet Take 1 tablet by mouth nightly Take 1/2 tab nightly for 8 nights then increase to full tab. 30 tablet 3    Multiple Vitamins-Minerals (THERAPEUTIC MULTIVITAMIN-MINERALS) tablet Take 1 tablet by mouth daily      omeprazole (PRILOSEC) 40 MG delayed release capsule TAKE ONE CAPSULE BY MOUTH EVERY MORNING BEFORE BREAKFAST 90 capsule 1    levothyroxine (SYNTHROID) 50 MCG tablet TAKE 1 TABLET BY MOUTH DAILY 90 tablet 1    clonazePAM (KLONOPIN) 0.5 MG tablet Take 1 tablet by mouth daily as needed. Dr richards      FLUoxetine (PROZAC) 20 MG capsule Take 1 capsule by mouth daily (Patient not taking: Reported on 5/3/2024)       No current facility-administered medications for this visit.       Physical Exam:      Mental Status   Orientation:

## 2024-05-22 ENCOUNTER — OFFICE VISIT (OUTPATIENT)
Dept: FAMILY MEDICINE CLINIC | Age: 34
End: 2024-05-22
Payer: COMMERCIAL

## 2024-05-22 VITALS
BODY MASS INDEX: 36.41 KG/M2 | SYSTOLIC BLOOD PRESSURE: 122 MMHG | HEIGHT: 67 IN | HEART RATE: 111 BPM | OXYGEN SATURATION: 98 % | WEIGHT: 232 LBS | DIASTOLIC BLOOD PRESSURE: 78 MMHG | TEMPERATURE: 98.1 F

## 2024-05-22 DIAGNOSIS — Z86.69 HISTORY OF TENSION HEADACHE: ICD-10-CM

## 2024-05-22 DIAGNOSIS — H47.10 OPTIC DISC EDEMA: ICD-10-CM

## 2024-05-22 DIAGNOSIS — R51.9 HEADACHE AROUND THE EYES: Primary | ICD-10-CM

## 2024-05-22 DIAGNOSIS — J06.9 VIRAL URI: ICD-10-CM

## 2024-05-22 PROCEDURE — 96372 THER/PROPH/DIAG INJ SC/IM: CPT | Performed by: PHYSICIAN ASSISTANT

## 2024-05-22 PROCEDURE — 99213 OFFICE O/P EST LOW 20 MIN: CPT | Performed by: PHYSICIAN ASSISTANT

## 2024-05-22 RX ORDER — KETOROLAC TROMETHAMINE 30 MG/ML
60 INJECTION, SOLUTION INTRAMUSCULAR; INTRAVENOUS ONCE
Status: COMPLETED | OUTPATIENT
Start: 2024-05-22 | End: 2024-05-22

## 2024-05-22 RX ADMIN — KETOROLAC TROMETHAMINE 60 MG: 30 INJECTION, SOLUTION INTRAMUSCULAR; INTRAVENOUS at 10:07

## 2024-05-22 NOTE — PROGRESS NOTES
posterior pharynx, postnasal drip noted.  No petechiae or exudate.  Airway patent.  Mild nasal congestion.  Eyes:  PERRLA, EOMI, conjunctiva normal, no discharge, no scleral icterus  Neck:  No tenderness, supple, no palpable thyromegaly or thyroid nodules  Lymphatic:  No lymphadenopathy noted  Cardiovascular:  Normal heart rate, normal rhythm, no murmurs, gallops or rubs  Thorax & Lungs:  Normal breath sounds, no respiratory distress, no wheezing, no rales, no rhonchi  Skin:  Warm, dry, no erythema, no rash  Neurologic:  Alert & oriented X 3, normal motor function, normal sensory function, no focal deficits noted  Psychiatric:  Affect normal, mood normal    ASSESSMENT & PLAN    Ileana was seen today for sinus problem.    Diagnoses and all orders for this visit:    Headache around the eyes  -     ketorolac (TORADOL) injection 60 mg    History of tension headache    Viral URI    Optic disc edema       Headache surrounding left eye.  She has a history of tension type headaches, typically described as a band across the forehead.  This headache did start when she also developed some URI symptoms.  We discussed likely viral etiology as symptoms have only been present for approximately 24 to 48 hours.  Patient politely declines steroids as they cause jitteriness.  Toradol 60 mg IM in office- has tolerated well in the past.  She will await reply from neurology in regards to the headache.  She does have tizanidine to try as needed.  She politely declines Fioricet today as well because it contains caffeine which also causes jitteriness.  Rest, fluids, voice rest, nasal saline, and a cool mist vaporizer encouraged.  F/U with Dr. Lr.  ER for any worsening or additional symptoms.       Medications Discontinued During This Encounter   Medication Reason    FLUoxetine (PROZAC) 20 MG capsule LIST CLEANUP    tiZANidine (ZANAFLEX) 4 MG tablet LIST CLEANUP        No follow-ups on file.     Plan of care reviewed with patient who

## 2024-06-23 DIAGNOSIS — E03.9 ACQUIRED HYPOTHYROIDISM: ICD-10-CM

## 2024-06-24 RX ORDER — LEVOTHYROXINE SODIUM 0.05 MG/1
50 TABLET ORAL DAILY
Qty: 90 TABLET | Refills: 1 | Status: SHIPPED | OUTPATIENT
Start: 2024-06-24 | End: 2024-12-24

## 2024-06-25 DIAGNOSIS — K21.9 GASTROESOPHAGEAL REFLUX DISEASE, UNSPECIFIED WHETHER ESOPHAGITIS PRESENT: ICD-10-CM

## 2024-06-25 RX ORDER — OMEPRAZOLE 40 MG/1
CAPSULE, DELAYED RELEASE ORAL
Qty: 90 CAPSULE | Refills: 1 | Status: SHIPPED | OUTPATIENT
Start: 2024-06-25

## 2024-08-15 RX ORDER — ONDANSETRON 4 MG/1
4 TABLET, ORALLY DISINTEGRATING ORAL 3 TIMES DAILY PRN
Qty: 21 TABLET | Refills: 0 | Status: SHIPPED | OUTPATIENT
Start: 2024-08-15

## 2024-08-20 DIAGNOSIS — Z57.9 OCCUPATIONAL EXPOSURE IN WORKPLACE: ICD-10-CM

## 2024-08-20 DIAGNOSIS — R19.7 ACUTE DIARRHEA: Primary | ICD-10-CM

## 2024-08-20 SDOH — HEALTH STABILITY - PHYSICAL HEALTH: OCCUPATIONAL EXPOSURE TO UNSPECIFIED RISK FACTOR: Z57.9

## 2024-08-21 ENCOUNTER — HOSPITAL ENCOUNTER (OUTPATIENT)
Age: 34
Setting detail: SPECIMEN
Discharge: HOME | End: 2024-08-21
Payer: COMMERCIAL

## 2024-08-21 PROCEDURE — 87324 CLOSTRIDIUM AG IA: CPT

## 2024-08-21 PROCEDURE — 87209 SMEAR COMPLEX STAIN: CPT

## 2024-08-21 PROCEDURE — 87177 OVA AND PARASITES SMEARS: CPT

## 2024-08-21 PROCEDURE — 87507 IADNA-DNA/RNA PROBE TQ 12-25: CPT

## 2024-08-21 PROCEDURE — 87449 NOS EACH ORGANISM AG IA: CPT

## 2024-08-22 LAB
ASTROVIRUS PCR: NOT DETECTED
C CAYETANENSIS DNA SPEC QL NAA+PROBE: NOT DETECTED
CAMPY SP DNA.DIARRHEA STL QL NAA+PROBE: NOT DETECTED
CRYPTOSP DNA SPEC QL NAA+PROBE: NOT DETECTED
E COLI DNA SPEC QL NAA+PROBE: NOT DETECTED
E COLI ENTEROAGGREGATIVE PCR: NOT DETECTED
E COLI ENTEROPATHOGENIC PCR: NOT DETECTED
E COLI ENTEROTOXIGENIC PCR: ABNORMAL
E COLI O157H7 DNA SPEC QL NAA+PROBE: NOT DETECTED
E HISTOLYT DNA SPEC QL NAA+PROBE: NOT DETECTED
EC STX1+STX2 + H7 FLIC SPEC NAA+PROBE: NOT DETECTED
G LAMBLIA DNA SPEC QL NAA+PROBE: NOT DETECTED
HADV DNA SPEC QL NAA+PROBE: NOT DETECTED
NOROVIRUS RNA SPEC QL NAA+PROBE: NOT DETECTED
P SHIGELLOIDES DNA STL QL NAA+PROBE: NOT DETECTED
REASON FOR REJECTION: NORMAL
REJECTED TEST: NORMAL
RV RNA SPEC QL NAA+PROBE: NOT DETECTED
SALMONELLA DNA SPEC QL NAA+PROBE: NOT DETECTED
SAPOVIRUS PCR: NOT DETECTED
V CHOLERAE DNA SPEC QL NAA+PROBE: NOT DETECTED
VIBRIO DNA SPEC NAA+PROBE: NOT DETECTED
YERSINIA DNA SPEC NAA+PROBE: NOT DETECTED

## 2024-08-22 RX ORDER — CIPROFLOXACIN 500 MG/1
500 TABLET, FILM COATED ORAL 2 TIMES DAILY
Qty: 10 TABLET | Refills: 0 | Status: SHIPPED | OUTPATIENT
Start: 2024-08-22 | End: 2024-08-27

## 2024-08-29 LAB — INTERPRETATION: NEGATIVE

## 2025-01-03 ENCOUNTER — OFFICE VISIT (OUTPATIENT)
Age: 35
End: 2025-01-03
Payer: COMMERCIAL

## 2025-01-03 VITALS
WEIGHT: 219 LBS | DIASTOLIC BLOOD PRESSURE: 74 MMHG | BODY MASS INDEX: 34.3 KG/M2 | HEART RATE: 85 BPM | SYSTOLIC BLOOD PRESSURE: 124 MMHG | OXYGEN SATURATION: 98 %

## 2025-01-03 DIAGNOSIS — H47.10 OPTIC DISC EDEMA: Primary | ICD-10-CM

## 2025-01-03 DIAGNOSIS — G44.219 EPISODIC TENSION-TYPE HEADACHE, NOT INTRACTABLE: ICD-10-CM

## 2025-01-03 PROCEDURE — 99213 OFFICE O/P EST LOW 20 MIN: CPT | Performed by: NURSE PRACTITIONER

## 2025-01-03 RX ORDER — FLUOXETINE HCL 20 MG
20 CAPSULE ORAL DAILY
COMMUNITY

## 2025-01-03 RX ORDER — TIZANIDINE 2 MG/1
2 TABLET ORAL NIGHTLY
Qty: 30 TABLET | Refills: 5 | Status: SHIPPED | OUTPATIENT
Start: 2025-01-03

## 2025-01-03 NOTE — PROGRESS NOTES
1/3/25    Ileana Davidson  1990    Chief Complaint   Patient presents with    Follow-up     Patient here for f/u on optic disc edema, tension type headache, per patient states head aches are better       History of Present Illness  Ileana is a 34 y.o. female presenting today for follow-up of: optic disc edema.     Initially, there was some concern for IIH due to mild papilledema on ophthalmology exam, lumbar puncture showed opening pressure of 20, not consistent with IIH.  She was encouraged to keep routine follow-ups with ophthalmology to monitor papilledema. She saw Dr Lr in September, has very mild optic disc edema. Ileana is working on weight loss. She does not want to start medication unless her optic disc edema worsens. Her next eye exam is with Dr Morrison in March.      Last visit she was having about 8 tension headaches per month.  Her headaches are not associated with light/sound sensitivity, nausea/vomiting, she denies vision changes or tinnitus. She was started on tizanidine 4 mg at bedtime, but she is only taking 1 mg nightly because the increased dose makes her feel drowsy.  She never gets a headache now.     She has had an in-home sleep study in the past, she was not diagnosed with MICHEL.    Current Outpatient Medications   Medication Sig Dispense Refill    PROZAC 20 MG capsule Take 1 capsule by mouth daily      tiZANidine (ZANAFLEX) 2 MG tablet Take 1 tablet by mouth at bedtime 30 tablet 5    ondansetron (ZOFRAN-ODT) 4 MG disintegrating tablet Take 1 tablet by mouth 3 times daily as needed for Nausea or Vomiting 21 tablet 0    omeprazole (PRILOSEC) 40 MG delayed release capsule TAKE ONE CAPSULE BY MOUTH EVERY MORNING BEFORE BREAKFAST 90 capsule 1    levothyroxine (SYNTHROID) 50 MCG tablet Take 1 tablet by mouth daily 90 tablet 1    clonazePAM (KLONOPIN) 0.5 MG tablet Take 1 tablet by mouth daily as needed. Dr richards       No current facility-administered medications for this visit.     /74

## 2025-01-07 ASSESSMENT — PATIENT HEALTH QUESTIONNAIRE - PHQ9
SUM OF ALL RESPONSES TO PHQ QUESTIONS 1-9: 0
2. FEELING DOWN, DEPRESSED OR HOPELESS: NOT AT ALL
SUM OF ALL RESPONSES TO PHQ9 QUESTIONS 1 & 2: 0
1. LITTLE INTEREST OR PLEASURE IN DOING THINGS: NOT AT ALL
2. FEELING DOWN, DEPRESSED OR HOPELESS: NOT AT ALL
SUM OF ALL RESPONSES TO PHQ QUESTIONS 1-9: 0
SUM OF ALL RESPONSES TO PHQ9 QUESTIONS 1 & 2: 0
SUM OF ALL RESPONSES TO PHQ QUESTIONS 1-9: 0
SUM OF ALL RESPONSES TO PHQ QUESTIONS 1-9: 0
1. LITTLE INTEREST OR PLEASURE IN DOING THINGS: NOT AT ALL

## 2025-01-10 ENCOUNTER — OFFICE VISIT (OUTPATIENT)
Dept: FAMILY MEDICINE CLINIC | Age: 35
End: 2025-01-10
Payer: COMMERCIAL

## 2025-01-10 VITALS
OXYGEN SATURATION: 98 % | HEART RATE: 99 BPM | WEIGHT: 214.4 LBS | DIASTOLIC BLOOD PRESSURE: 80 MMHG | HEIGHT: 67 IN | SYSTOLIC BLOOD PRESSURE: 110 MMHG | BODY MASS INDEX: 33.65 KG/M2

## 2025-01-10 DIAGNOSIS — E55.9 VITAMIN D DEFICIENCY: ICD-10-CM

## 2025-01-10 DIAGNOSIS — E28.2 PCOS (POLYCYSTIC OVARIAN SYNDROME): ICD-10-CM

## 2025-01-10 DIAGNOSIS — E03.9 ACQUIRED HYPOTHYROIDISM: ICD-10-CM

## 2025-01-10 DIAGNOSIS — K21.9 GASTROESOPHAGEAL REFLUX DISEASE, UNSPECIFIED WHETHER ESOPHAGITIS PRESENT: ICD-10-CM

## 2025-01-10 DIAGNOSIS — E78.2 MODERATE MIXED HYPERLIPIDEMIA NOT REQUIRING STATIN THERAPY: ICD-10-CM

## 2025-01-10 DIAGNOSIS — E55.9 VITAMIN D DEFICIENCY: Primary | ICD-10-CM

## 2025-01-10 LAB
25(OH)D3 SERPL-MCNC: 16.1 NG/ML
ALBUMIN SERPL-MCNC: 4.5 G/DL (ref 3.4–5)
ALBUMIN/GLOB SERPL: 1.7 {RATIO} (ref 1.1–2.2)
ALP SERPL-CCNC: 80 U/L (ref 40–129)
ALT SERPL-CCNC: 20 U/L (ref 10–40)
ANION GAP SERPL CALCULATED.3IONS-SCNC: 11 MMOL/L (ref 3–16)
AST SERPL-CCNC: 15 U/L (ref 15–37)
BASOPHILS # BLD: 0 K/UL (ref 0–0.2)
BASOPHILS NFR BLD: 0.6 %
BILIRUB SERPL-MCNC: 0.4 MG/DL (ref 0–1)
BUN SERPL-MCNC: 9 MG/DL (ref 7–20)
CALCIUM SERPL-MCNC: 9.6 MG/DL (ref 8.3–10.6)
CHLORIDE SERPL-SCNC: 103 MMOL/L (ref 99–110)
CHOLEST SERPL-MCNC: 204 MG/DL (ref 0–199)
CO2 SERPL-SCNC: 25 MMOL/L (ref 21–32)
CREAT SERPL-MCNC: 0.9 MG/DL (ref 0.6–1.1)
DEPRECATED RDW RBC AUTO: 14.5 % (ref 12.4–15.4)
EOSINOPHIL # BLD: 0.1 K/UL (ref 0–0.6)
EOSINOPHIL NFR BLD: 1.5 %
GFR SERPLBLD CREATININE-BSD FMLA CKD-EPI: 86 ML/MIN/{1.73_M2}
GLUCOSE SERPL-MCNC: 89 MG/DL (ref 70–99)
HCT VFR BLD AUTO: 40.5 % (ref 36–48)
HDLC SERPL-MCNC: 35 MG/DL (ref 40–60)
HGB BLD-MCNC: 13.4 G/DL (ref 12–16)
LDL CHOLESTEROL: 143 MG/DL
LYMPHOCYTES # BLD: 2.1 K/UL (ref 1–5.1)
LYMPHOCYTES NFR BLD: 30.3 %
MCH RBC QN AUTO: 28.5 PG (ref 26–34)
MCHC RBC AUTO-ENTMCNC: 33 G/DL (ref 31–36)
MCV RBC AUTO: 86.4 FL (ref 80–100)
MONOCYTES # BLD: 0.3 K/UL (ref 0–1.3)
MONOCYTES NFR BLD: 4.7 %
NEUTROPHILS # BLD: 4.3 K/UL (ref 1.7–7.7)
NEUTROPHILS NFR BLD: 62.9 %
PLATELET # BLD AUTO: 339 K/UL (ref 135–450)
PMV BLD AUTO: 8.8 FL (ref 5–10.5)
POTASSIUM SERPL-SCNC: 4.6 MMOL/L (ref 3.5–5.1)
PROT SERPL-MCNC: 7.1 G/DL (ref 6.4–8.2)
RBC # BLD AUTO: 4.69 M/UL (ref 4–5.2)
SODIUM SERPL-SCNC: 139 MMOL/L (ref 136–145)
TRIGL SERPL-MCNC: 129 MG/DL (ref 0–150)
TSH SERPL DL<=0.005 MIU/L-ACNC: 1.52 UIU/ML (ref 0.27–4.2)
VLDLC SERPL CALC-MCNC: 26 MG/DL
WBC # BLD AUTO: 6.9 K/UL (ref 4–11)

## 2025-01-10 PROCEDURE — 4004F PT TOBACCO SCREEN RCVD TLK: CPT | Performed by: STUDENT IN AN ORGANIZED HEALTH CARE EDUCATION/TRAINING PROGRAM

## 2025-01-10 PROCEDURE — G8417 CALC BMI ABV UP PARAM F/U: HCPCS | Performed by: STUDENT IN AN ORGANIZED HEALTH CARE EDUCATION/TRAINING PROGRAM

## 2025-01-10 PROCEDURE — 99214 OFFICE O/P EST MOD 30 MIN: CPT | Performed by: STUDENT IN AN ORGANIZED HEALTH CARE EDUCATION/TRAINING PROGRAM

## 2025-01-10 PROCEDURE — G8427 DOCREV CUR MEDS BY ELIG CLIN: HCPCS | Performed by: STUDENT IN AN ORGANIZED HEALTH CARE EDUCATION/TRAINING PROGRAM

## 2025-01-10 RX ORDER — LEVOTHYROXINE SODIUM 50 UG/1
50 TABLET ORAL DAILY
Qty: 90 TABLET | Refills: 1 | Status: SHIPPED | OUTPATIENT
Start: 2025-01-10 | End: 2025-07-12

## 2025-01-10 RX ORDER — OMEPRAZOLE 40 MG/1
CAPSULE, DELAYED RELEASE ORAL
Qty: 90 CAPSULE | Refills: 1 | Status: SHIPPED | OUTPATIENT
Start: 2025-01-10

## 2025-01-10 RX ORDER — METFORMIN HYDROCHLORIDE 500 MG/1
500 TABLET, EXTENDED RELEASE ORAL
Qty: 30 TABLET | Refills: 0 | Status: SHIPPED | OUTPATIENT
Start: 2025-01-10

## 2025-01-10 SDOH — ECONOMIC STABILITY: FOOD INSECURITY: WITHIN THE PAST 12 MONTHS, THE FOOD YOU BOUGHT JUST DIDN'T LAST AND YOU DIDN'T HAVE MONEY TO GET MORE.: NEVER TRUE

## 2025-01-10 SDOH — ECONOMIC STABILITY: FOOD INSECURITY: WITHIN THE PAST 12 MONTHS, YOU WORRIED THAT YOUR FOOD WOULD RUN OUT BEFORE YOU GOT MONEY TO BUY MORE.: NEVER TRUE

## 2025-01-10 NOTE — PROGRESS NOTES
Never   Vaping Use    Vaping status: Never Used   Substance and Sexual Activity    Alcohol use: Not Currently     Comment: socially    Drug use: No    Sexual activity: Yes     Partners: Male     Social Determinants of Health     Financial Resource Strain: Low Risk  (3/10/2021)    Overall Financial Resource Strain (CARDIA)     Difficulty of Paying Living Expenses: Not hard at all   Food Insecurity: No Food Insecurity (1/10/2025)    Hunger Vital Sign     Worried About Running Out of Food in the Last Year: Never true     Ran Out of Food in the Last Year: Never true   Transportation Needs: No Transportation Needs (1/10/2025)    PRAPARE - Transportation     Lack of Transportation (Medical): No     Lack of Transportation (Non-Medical): No   Housing Stability: Low Risk  (1/10/2025)    Housing Stability Vital Sign     Unable to Pay for Housing in the Last Year: No     Number of Times Moved in the Last Year: 0     Homeless in the Last Year: No        SURGICAL HISTORY  No past surgical history on file.              CURRENT MEDICATIONS  Current Outpatient Medications   Medication Sig Dispense Refill    levothyroxine (SYNTHROID) 50 MCG tablet Take 1 tablet by mouth daily 90 tablet 1    omeprazole (PRILOSEC) 40 MG delayed release capsule TAKE ONE CAPSULE BY MOUTH EVERY MORNING BEFORE BREAKFAST 90 capsule 1    metFORMIN (GLUCOPHAGE-XR) 500 MG extended release tablet Take 1 tablet by mouth daily (with breakfast) 30 tablet 0    PROZAC 20 MG capsule Take 1 capsule by mouth daily      tiZANidine (ZANAFLEX) 2 MG tablet Take 1 tablet by mouth at bedtime 30 tablet 5    ondansetron (ZOFRAN-ODT) 4 MG disintegrating tablet Take 1 tablet by mouth 3 times daily as needed for Nausea or Vomiting 21 tablet 0    clonazePAM (KLONOPIN) 0.5 MG tablet Take 1 tablet by mouth daily as needed. Dr richards       No current facility-administered medications for this visit.       ALLERGIES  Allergies   Allergen Reactions    Codeine Palpitations

## 2025-01-27 ENCOUNTER — PATIENT MESSAGE (OUTPATIENT)
Dept: FAMILY MEDICINE CLINIC | Age: 35
End: 2025-01-27

## 2025-01-27 DIAGNOSIS — E28.2 PCOS (POLYCYSTIC OVARIAN SYNDROME): ICD-10-CM

## 2025-01-27 DIAGNOSIS — E66.9 OBESITY (BMI 30-39.9): Primary | ICD-10-CM

## 2025-01-29 ASSESSMENT — ENCOUNTER SYMPTOMS
SHORTNESS OF BREATH: 0
ABDOMINAL PAIN: 0
SORE THROAT: 0
WHEEZING: 0
NAUSEA: 0

## 2025-02-03 RX ORDER — METFORMIN HYDROCHLORIDE 500 MG/1
500 TABLET, EXTENDED RELEASE ORAL 2 TIMES DAILY
Qty: 180 TABLET | Refills: 1 | Status: SHIPPED | OUTPATIENT
Start: 2025-02-03 | End: 2025-08-02

## 2025-06-15 DIAGNOSIS — E03.9 ACQUIRED HYPOTHYROIDISM: ICD-10-CM

## 2025-06-15 DIAGNOSIS — K21.9 GASTROESOPHAGEAL REFLUX DISEASE, UNSPECIFIED WHETHER ESOPHAGITIS PRESENT: ICD-10-CM

## 2025-06-17 RX ORDER — LEVOTHYROXINE SODIUM 50 UG/1
50 TABLET ORAL DAILY
Qty: 90 TABLET | Refills: 0 | Status: SHIPPED | OUTPATIENT
Start: 2025-06-17 | End: 2025-12-17

## 2025-06-17 RX ORDER — OMEPRAZOLE 40 MG/1
40 CAPSULE, DELAYED RELEASE ORAL
Qty: 90 CAPSULE | Refills: 0 | Status: SHIPPED | OUTPATIENT
Start: 2025-06-17

## 2025-08-09 DIAGNOSIS — K21.9 GASTROESOPHAGEAL REFLUX DISEASE, UNSPECIFIED WHETHER ESOPHAGITIS PRESENT: ICD-10-CM

## 2025-08-09 DIAGNOSIS — E03.9 ACQUIRED HYPOTHYROIDISM: ICD-10-CM

## 2025-08-11 RX ORDER — OMEPRAZOLE 40 MG/1
40 CAPSULE, DELAYED RELEASE ORAL
Qty: 30 CAPSULE | Refills: 0 | Status: SHIPPED | OUTPATIENT
Start: 2025-08-11

## 2025-08-11 RX ORDER — LEVOTHYROXINE SODIUM 50 UG/1
50 TABLET ORAL DAILY
Qty: 30 TABLET | Refills: 0 | Status: SHIPPED | OUTPATIENT
Start: 2025-08-11 | End: 2026-02-10